# Patient Record
Sex: FEMALE | Race: WHITE | NOT HISPANIC OR LATINO | Employment: OTHER | URBAN - METROPOLITAN AREA
[De-identification: names, ages, dates, MRNs, and addresses within clinical notes are randomized per-mention and may not be internally consistent; named-entity substitution may affect disease eponyms.]

---

## 2023-11-15 ENCOUNTER — ANCILLARY PROCEDURE (OUTPATIENT)
Dept: RADIOLOGY | Facility: CLINIC | Age: 78
End: 2023-11-15
Payer: MEDICARE

## 2023-11-15 ENCOUNTER — LAB (OUTPATIENT)
Dept: LAB | Facility: LAB | Age: 78
End: 2023-11-15
Payer: MEDICARE

## 2023-11-15 ENCOUNTER — OFFICE VISIT (OUTPATIENT)
Dept: PRIMARY CARE | Facility: CLINIC | Age: 78
End: 2023-11-15
Payer: MEDICARE

## 2023-11-15 VITALS
SYSTOLIC BLOOD PRESSURE: 107 MMHG | HEIGHT: 62 IN | WEIGHT: 127.8 LBS | HEART RATE: 67 BPM | TEMPERATURE: 97.4 F | DIASTOLIC BLOOD PRESSURE: 69 MMHG | BODY MASS INDEX: 23.52 KG/M2 | OXYGEN SATURATION: 98 %

## 2023-11-15 DIAGNOSIS — Z00.00 ROUTINE GENERAL MEDICAL EXAMINATION AT HEALTH CARE FACILITY: ICD-10-CM

## 2023-11-15 DIAGNOSIS — E78.49 OTHER HYPERLIPIDEMIA: ICD-10-CM

## 2023-11-15 DIAGNOSIS — Z00.00 MEDICARE ANNUAL WELLNESS VISIT, SUBSEQUENT: Primary | ICD-10-CM

## 2023-11-15 DIAGNOSIS — R53.83 OTHER FATIGUE: ICD-10-CM

## 2023-11-15 DIAGNOSIS — B00.9 HERPES: ICD-10-CM

## 2023-11-15 DIAGNOSIS — R21 RASH: ICD-10-CM

## 2023-11-15 DIAGNOSIS — E78.5 HYPERLIPIDEMIA, UNSPECIFIED HYPERLIPIDEMIA TYPE: ICD-10-CM

## 2023-11-15 DIAGNOSIS — E55.9 VITAMIN D DEFICIENCY: ICD-10-CM

## 2023-11-15 DIAGNOSIS — E78.00 ELEVATED SERUM CHOLESTEROL: ICD-10-CM

## 2023-11-15 DIAGNOSIS — Z12.31 VISIT FOR SCREENING MAMMOGRAM: ICD-10-CM

## 2023-11-15 DIAGNOSIS — D12.6 ADENOMATOUS POLYP OF COLON, UNSPECIFIED PART OF COLON: ICD-10-CM

## 2023-11-15 DIAGNOSIS — Z13.6 SCREENING FOR HEART DISEASE: ICD-10-CM

## 2023-11-15 DIAGNOSIS — M40.04 POSTURAL KYPHOSIS OF THORACIC REGION: ICD-10-CM

## 2023-11-15 DIAGNOSIS — M54.9 UPPER BACK PAIN: ICD-10-CM

## 2023-11-15 DIAGNOSIS — I83.813 VARICOSE VEINS OF BOTH LOWER EXTREMITIES WITH PAIN: ICD-10-CM

## 2023-11-15 DIAGNOSIS — L23.9 ALLERGIC CONTACT DERMATITIS, UNSPECIFIED TRIGGER: ICD-10-CM

## 2023-11-15 DIAGNOSIS — D12.6 TUBULAR ADENOMA OF COLON: ICD-10-CM

## 2023-11-15 PROBLEM — D22.5 MELANOCYTIC NEVI OF TRUNK: Status: ACTIVE | Noted: 2023-05-02

## 2023-11-15 PROBLEM — L82.1 OTHER SEBORRHEIC KERATOSIS: Status: ACTIVE | Noted: 2023-05-02

## 2023-11-15 PROBLEM — R92.2 DENSE BREASTS: Status: ACTIVE | Noted: 2023-11-15

## 2023-11-15 PROBLEM — L57.8 OTHER SKIN CHANGES DUE TO CHRONIC EXPOSURE TO NONIONIZING RADIATION: Status: ACTIVE | Noted: 2023-05-02

## 2023-11-15 PROBLEM — R92.30 DENSE BREASTS: Status: ACTIVE | Noted: 2023-11-15

## 2023-11-15 PROBLEM — K63.5 COLON POLYP: Status: ACTIVE | Noted: 2023-11-15

## 2023-11-15 PROBLEM — D18.01 HEMANGIOMA OF SKIN AND SUBCUTANEOUS TISSUE: Status: ACTIVE | Noted: 2023-05-02

## 2023-11-15 LAB
25(OH)D3 SERPL-MCNC: 50 NG/ML (ref 30–100)
ALBUMIN SERPL BCP-MCNC: 4.6 G/DL (ref 3.4–5)
ALP SERPL-CCNC: 69 U/L (ref 33–136)
ALT SERPL W P-5'-P-CCNC: 14 U/L (ref 7–45)
ANION GAP SERPL CALC-SCNC: 15 MMOL/L (ref 10–20)
AST SERPL W P-5'-P-CCNC: 25 U/L (ref 9–39)
BASOPHILS # BLD AUTO: 0.07 X10*3/UL (ref 0–0.1)
BASOPHILS NFR BLD AUTO: 1.4 %
BILIRUB SERPL-MCNC: 0.9 MG/DL (ref 0–1.2)
BUN SERPL-MCNC: 15 MG/DL (ref 6–23)
CALCIUM SERPL-MCNC: 9.7 MG/DL (ref 8.6–10.3)
CHLORIDE SERPL-SCNC: 102 MMOL/L (ref 98–107)
CHOLEST SERPL-MCNC: 216 MG/DL (ref 0–199)
CHOLESTEROL/HDL RATIO: 3.1
CO2 SERPL-SCNC: 28 MMOL/L (ref 21–32)
CREAT SERPL-MCNC: 0.84 MG/DL (ref 0.5–1.05)
EOSINOPHIL # BLD AUTO: 0.09 X10*3/UL (ref 0–0.4)
EOSINOPHIL NFR BLD AUTO: 1.8 %
ERYTHROCYTE [DISTWIDTH] IN BLOOD BY AUTOMATED COUNT: 13.1 % (ref 11.5–14.5)
GFR SERPL CREATININE-BSD FRML MDRD: 72 ML/MIN/1.73M*2
GLUCOSE SERPL-MCNC: 78 MG/DL (ref 74–99)
HCT VFR BLD AUTO: 39.8 % (ref 36–46)
HDLC SERPL-MCNC: 70.1 MG/DL
HGB BLD-MCNC: 13.1 G/DL (ref 12–16)
IMM GRANULOCYTES # BLD AUTO: 0.01 X10*3/UL (ref 0–0.5)
IMM GRANULOCYTES NFR BLD AUTO: 0.2 % (ref 0–0.9)
LDLC SERPL CALC-MCNC: 128 MG/DL
LYMPHOCYTES # BLD AUTO: 1.43 X10*3/UL (ref 0.8–3)
LYMPHOCYTES NFR BLD AUTO: 28.8 %
MCH RBC QN AUTO: 31.3 PG (ref 26–34)
MCHC RBC AUTO-ENTMCNC: 32.9 G/DL (ref 32–36)
MCV RBC AUTO: 95 FL (ref 80–100)
MONOCYTES # BLD AUTO: 0.34 X10*3/UL (ref 0.05–0.8)
MONOCYTES NFR BLD AUTO: 6.8 %
NEUTROPHILS # BLD AUTO: 3.03 X10*3/UL (ref 1.6–5.5)
NEUTROPHILS NFR BLD AUTO: 61 %
NON HDL CHOLESTEROL: 146 MG/DL (ref 0–149)
NRBC BLD-RTO: 0 /100 WBCS (ref 0–0)
PLATELET # BLD AUTO: 242 X10*3/UL (ref 150–450)
POTASSIUM SERPL-SCNC: 4.5 MMOL/L (ref 3.5–5.3)
PROT SERPL-MCNC: 7.5 G/DL (ref 6.4–8.2)
RBC # BLD AUTO: 4.19 X10*6/UL (ref 4–5.2)
SODIUM SERPL-SCNC: 140 MMOL/L (ref 136–145)
TRIGL SERPL-MCNC: 92 MG/DL (ref 0–149)
TSH SERPL-ACNC: 3 MIU/L (ref 0.44–3.98)
VLDL: 18 MG/DL (ref 0–40)
WBC # BLD AUTO: 5 X10*3/UL (ref 4.4–11.3)

## 2023-11-15 PROCEDURE — 80061 LIPID PANEL: CPT

## 2023-11-15 PROCEDURE — 72072 X-RAY EXAM THORAC SPINE 3VWS: CPT | Performed by: RADIOLOGY

## 2023-11-15 PROCEDURE — 72070 X-RAY EXAM THORAC SPINE 2VWS: CPT | Mod: FY

## 2023-11-15 PROCEDURE — 99214 OFFICE O/P EST MOD 30 MIN: CPT | Performed by: INTERNAL MEDICINE

## 2023-11-15 PROCEDURE — 1160F RVW MEDS BY RX/DR IN RCRD: CPT | Performed by: INTERNAL MEDICINE

## 2023-11-15 PROCEDURE — 85025 COMPLETE CBC W/AUTO DIFF WBC: CPT

## 2023-11-15 PROCEDURE — 1159F MED LIST DOCD IN RCRD: CPT | Performed by: INTERNAL MEDICINE

## 2023-11-15 PROCEDURE — 93000 ELECTROCARDIOGRAM COMPLETE: CPT | Performed by: INTERNAL MEDICINE

## 2023-11-15 PROCEDURE — G0442 ANNUAL ALCOHOL SCREEN 15 MIN: HCPCS | Performed by: INTERNAL MEDICINE

## 2023-11-15 PROCEDURE — G0444 DEPRESSION SCREEN ANNUAL: HCPCS | Performed by: INTERNAL MEDICINE

## 2023-11-15 PROCEDURE — 84443 ASSAY THYROID STIM HORMONE: CPT

## 2023-11-15 PROCEDURE — 80053 COMPREHEN METABOLIC PANEL: CPT

## 2023-11-15 PROCEDURE — 82306 VITAMIN D 25 HYDROXY: CPT

## 2023-11-15 PROCEDURE — 1036F TOBACCO NON-USER: CPT | Performed by: INTERNAL MEDICINE

## 2023-11-15 PROCEDURE — 36415 COLL VENOUS BLD VENIPUNCTURE: CPT

## 2023-11-15 PROCEDURE — 1170F FXNL STATUS ASSESSED: CPT | Performed by: INTERNAL MEDICINE

## 2023-11-15 PROCEDURE — G0439 PPPS, SUBSEQ VISIT: HCPCS | Performed by: INTERNAL MEDICINE

## 2023-11-15 RX ORDER — FLUTICASONE PROPIONATE 50 MCG
1 SPRAY, SUSPENSION (ML) NASAL 2 TIMES DAILY
COMMUNITY
Start: 2022-08-29 | End: 2023-11-15 | Stop reason: ALTCHOICE

## 2023-11-15 RX ORDER — CETIRIZINE HYDROCHLORIDE 10 MG/1
10 TABLET ORAL
COMMUNITY
Start: 2022-08-29 | End: 2023-11-15 | Stop reason: ALTCHOICE

## 2023-11-15 RX ORDER — MOMETASONE FUROATE 1 MG/G
1 CREAM TOPICAL DAILY
Qty: 15 G | Refills: 1 | Status: SHIPPED | OUTPATIENT
Start: 2023-11-15

## 2023-11-15 RX ORDER — ACYCLOVIR 400 MG/1
400 TABLET ORAL 2 TIMES DAILY
Qty: 60 TABLET | Refills: 2 | Status: SHIPPED | OUTPATIENT
Start: 2023-11-15

## 2023-11-15 RX ORDER — ACYCLOVIR 400 MG/1
1 TABLET ORAL 2 TIMES DAILY
COMMUNITY
Start: 2015-03-12 | End: 2023-11-15 | Stop reason: SDUPTHER

## 2023-11-15 RX ORDER — MOMETASONE FUROATE 1 MG/G
1 CREAM TOPICAL 2 TIMES DAILY
COMMUNITY
Start: 2012-06-06 | End: 2023-11-15 | Stop reason: SDUPTHER

## 2023-11-15 RX ORDER — MULTIVITAMIN
1 TABLET ORAL DAILY
COMMUNITY

## 2023-11-15 ASSESSMENT — ACTIVITIES OF DAILY LIVING (ADL)
DOING_HOUSEWORK: INDEPENDENT
GROCERY_SHOPPING: INDEPENDENT
BATHING: INDEPENDENT
MANAGING_FINANCES: INDEPENDENT
DRESSING: INDEPENDENT
TAKING_MEDICATION: INDEPENDENT

## 2023-11-15 ASSESSMENT — ENCOUNTER SYMPTOMS
CONSTITUTIONAL NEGATIVE: 1
NEUROLOGICAL NEGATIVE: 1
HEMATOLOGIC/LYMPHATIC NEGATIVE: 1
PSYCHIATRIC NEGATIVE: 1
GASTROINTESTINAL NEGATIVE: 1
EYES NEGATIVE: 1
RESPIRATORY NEGATIVE: 1
ROS SKIN COMMENTS: HPI.
CARDIOVASCULAR NEGATIVE: 1

## 2023-11-15 ASSESSMENT — PATIENT HEALTH QUESTIONNAIRE - PHQ9
SUM OF ALL RESPONSES TO PHQ9 QUESTIONS 1 AND 2: 0
1. LITTLE INTEREST OR PLEASURE IN DOING THINGS: NOT AT ALL
2. FEELING DOWN, DEPRESSED OR HOPELESS: NOT AT ALL

## 2023-11-15 NOTE — ASSESSMENT & PLAN NOTE
Renew steroid cream.   Calcipotriene Pregnancy And Lactation Text: This medication has not been proven safe during pregnancy. It is unknown if this medication is excreted in breast milk.

## 2023-11-15 NOTE — PROGRESS NOTES
"Subjective   Patient ID: Nhung Pimentel is a 77 y.o. female who presents for Medicare Annual Wellness Visit Subsequent.    This is a 77 year old pt here for subsequent MCR,,f/u on HLD .  she has achy legs and varicoses of legs.  she has \"bad posture\".  She tried to wear back brace but it caused some discomfort last DEXA look okay from last years.  she had covid in August 2022,mild case.   Last hector 11/25/22.  Last DEXA 12/1/22.  Last colonoscopy 11/4/2019,next in 5 years due to h/o adenoma.  She has not frequent oral herpes and occasional rash when working in the yard and she needs refill on both prescription for topical steroid and oral Zovirax.  She eats healthy in general and stay active denies any chest pain or shortness of breath.         Review of Systems   Constitutional: Negative.    HENT: Negative.     Eyes: Negative.    Respiratory: Negative.     Cardiovascular: Negative.    Gastrointestinal: Negative.    Genitourinary: Negative.    Musculoskeletal:         As HPI.   Skin:         HPI.   Neurological: Negative.    Hematological: Negative.    Psychiatric/Behavioral: Negative.         Objective   /69   Pulse 67   Temp 36.3 °C (97.4 °F)   Ht 1.562 m (5' 1.5\")   Wt 58 kg (127 lb 12.8 oz)   SpO2 98%   BMI 23.76 kg/m²     Physical Exam  Vitals reviewed.   Constitutional:       Appearance: Normal appearance.   HENT:      Head: Normocephalic and atraumatic.      Right Ear: Tympanic membrane and ear canal normal.      Left Ear: Tympanic membrane and ear canal normal.   Eyes:      Extraocular Movements: Extraocular movements intact.      Pupils: Pupils are equal, round, and reactive to light.   Neck:      Vascular: No carotid bruit.   Cardiovascular:      Rate and Rhythm: Normal rate and regular rhythm.      Pulses: Normal pulses.      Heart sounds: Normal heart sounds. No murmur heard.     Comments: Pattern dilated varicose veins on both legs worse on the right side.  Pulmonary:      Effort: Pulmonary " effort is normal.      Breath sounds: Normal breath sounds.   Abdominal:      General: Abdomen is flat. Bowel sounds are normal.      Palpations: Abdomen is soft.   Musculoskeletal:      Cervical back: Normal range of motion and neck supple.      Right lower leg: No edema.      Left lower leg: No edema.      Comments: Back kyphosis.  Limited range of motion of both hips worse on the right hip.   Lymphadenopathy:      Cervical: No cervical adenopathy.   Skin:     Comments: Supportive keratosis on back.   Neurological:      General: No focal deficit present.      Mental Status: She is alert and oriented to person, place, and time.   Psychiatric:         Mood and Affect: Mood normal.         Assessment/Plan   Problem List Items Addressed This Visit             ICD-10-CM    Allergic contact dermatitis, unspecified cause L23.9     Renew steroid cream.         Colon polyp K63.5    Elevated serum cholesterol E78.00     Continue low-fat diet.  EKG done today.  Will check CT cardiac scoring for CAD screening she told me that her father  at age 54 but she was not close to needing and aware of the  cause of death, but he was alcoholic.         Herpes B00.9     Renew Zovirax she gets flareup very rarely.         Relevant Medications    acyclovir (Zovirax) 400 mg tablet    Other fatigue R53.83    Relevant Orders    CBC and Auto Differential    Comprehensive Metabolic Panel    TSH with reflex to Free T4 if abnormal    Tubular adenoma of colon D12.6     Colonoscopy up-to-date next colonoscopy 2024.         Medicare annual wellness visit, subsequent - Primary Z00.00     Order fasting labs.  Order mammogram.  Return in 1 year.  Eat healthy,exercise regularly.  Next colonoscopy 24.  Next DEXA 24.  I recommend RSV vaccine.         Other hyperlipidemia E78.49    Relevant Orders    Lipid Panel    TSH with reflex to Free T4 if abnormal    Hyperlipidemia E78.5    Relevant Orders    Lipid Panel    TSH with reflex  to Free T4 if abnormal    ECG 12 lead (Clinic Performed) (Completed)    Visit for screening mammogram Z12.31    Relevant Orders    BI mammo bilateral screening tomosynthesis    Screening for heart disease Z13.6    Relevant Orders    CT cardiac scoring wo IV contrast    Upper back pain M54.9    Relevant Orders    XR thoracic spine 2 views    Referral to Physical Therapy    Postural kyphosis of thoracic region M40.04     DEXA up-to-date.  Refer to physical therapy.  Check thoracic spine x-ray.         Vitamin D deficiency E55.9    Relevant Orders    Vitamin D 25-Hydroxy,Total (for eval of Vitamin D levels)    Varicose veins of both lower extremities with pain I83.813     Recommend compression stocking.  Refer to vascular surgeon, to see Dr. Meredith.          Other Visit Diagnoses         Codes    Rash     R21    Relevant Medications    mometasone (Elocon) 0.1 % cream    Routine general medical examination at health care facility     Z00.00

## 2023-11-15 NOTE — ASSESSMENT & PLAN NOTE
Order fasting labs.  Order mammogram.  Return in 1 year.  Eat healthy,exercise regularly.  Next colonoscopy 11/4/24.  Next DEXA 12/1/24.  I recommend RSV vaccine.

## 2023-11-15 NOTE — ASSESSMENT & PLAN NOTE
Continue low-fat diet.  EKG done today.  Will check CT cardiac scoring for CAD screening she told me that her father  at age 54 but she was not close to needing and aware of the  cause of death, but he was alcoholic.

## 2023-12-01 ENCOUNTER — ANCILLARY PROCEDURE (OUTPATIENT)
Dept: RADIOLOGY | Facility: CLINIC | Age: 78
End: 2023-12-01
Payer: MEDICARE

## 2023-12-01 DIAGNOSIS — Z13.6 SCREENING FOR HEART DISEASE: ICD-10-CM

## 2023-12-01 PROCEDURE — 75571 CT HRT W/O DYE W/CA TEST: CPT

## 2023-12-05 ENCOUNTER — TELEPHONE (OUTPATIENT)
Dept: PRIMARY CARE | Facility: CLINIC | Age: 78
End: 2023-12-05
Payer: MEDICARE

## 2023-12-05 NOTE — RESULT ENCOUNTER NOTE
Please call the patient regarding her abnormal result.  CT showed possible pneumonia please schedule an appointment to see me at the office this week for 15 minutes and have him to COVID test at home.

## 2023-12-05 NOTE — TELEPHONE ENCOUNTER
----- Message from Rachel Stewart MD sent at 12/5/2023  1:37 PM EST -----  Regarding: appt  Please call patient tell her that,Her cardiac calcium score show possible pneumonia tender to see me tomorrow at 915 at the office but due to COVID test at home if positive will change to virtual.

## 2023-12-06 ENCOUNTER — OFFICE VISIT (OUTPATIENT)
Dept: PRIMARY CARE | Facility: CLINIC | Age: 78
End: 2023-12-06
Payer: MEDICARE

## 2023-12-06 VITALS
HEIGHT: 62 IN | OXYGEN SATURATION: 96 % | TEMPERATURE: 97.3 F | HEART RATE: 67 BPM | WEIGHT: 126.2 LBS | BODY MASS INDEX: 23.22 KG/M2 | DIASTOLIC BLOOD PRESSURE: 77 MMHG | SYSTOLIC BLOOD PRESSURE: 134 MMHG

## 2023-12-06 DIAGNOSIS — J47.0 BRONCHIECTASIS WITH ACUTE LOWER RESPIRATORY INFECTION (MULTI): Primary | ICD-10-CM

## 2023-12-06 DIAGNOSIS — E78.00 ELEVATED SERUM CHOLESTEROL: ICD-10-CM

## 2023-12-06 PROCEDURE — 1036F TOBACCO NON-USER: CPT | Performed by: INTERNAL MEDICINE

## 2023-12-06 PROCEDURE — 1159F MED LIST DOCD IN RCRD: CPT | Performed by: INTERNAL MEDICINE

## 2023-12-06 PROCEDURE — 99214 OFFICE O/P EST MOD 30 MIN: CPT | Performed by: INTERNAL MEDICINE

## 2023-12-06 PROCEDURE — 1160F RVW MEDS BY RX/DR IN RCRD: CPT | Performed by: INTERNAL MEDICINE

## 2023-12-06 RX ORDER — AZITHROMYCIN 250 MG/1
TABLET, FILM COATED ORAL
Qty: 6 TABLET | Refills: 0 | Status: SHIPPED | OUTPATIENT
Start: 2023-12-06 | End: 2023-12-07

## 2023-12-06 RX ORDER — ALBUTEROL SULFATE 90 UG/1
2 AEROSOL, METERED RESPIRATORY (INHALATION) EVERY 4 HOURS PRN
Qty: 8.5 G | Refills: 0 | Status: SHIPPED | OUTPATIENT
Start: 2023-12-06 | End: 2024-12-05

## 2023-12-06 ASSESSMENT — PATIENT HEALTH QUESTIONNAIRE - PHQ9
SUM OF ALL RESPONSES TO PHQ9 QUESTIONS 1 AND 2: 0
2. FEELING DOWN, DEPRESSED OR HOPELESS: NOT AT ALL
1. LITTLE INTEREST OR PLEASURE IN DOING THINGS: NOT AT ALL

## 2023-12-06 NOTE — PATIENT INSTRUCTIONS
Take prescribed antibiotic.  Take OTC Mucinex 1 tab twice daily with large glass of water.  Refer to pulmonologist.  Use inhaler as needed for shortness of breath.

## 2023-12-06 NOTE — PROGRESS NOTES
"Subjective   Patient ID: Nhung Pimentel is a 78 y.o. female who presents for Discuss cardiac calcium test , Possible pneumonia , and COVID negative .    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  Patient states she noted occasional need to clear up her chest,small cough for few weeks, denies any wheezing or shortness of breath, she was never a smoker, no fever or chills.no blood in her sputum,no weight loss,no h/o travel.  Her home covid test was negative today.         Review of Systems   Constitutional: Negative.    HENT: Negative.     Eyes: Negative.    Respiratory:          As HPI.   Cardiovascular: Negative.    Gastrointestinal: Negative.    Genitourinary: Negative.    Neurological: Negative.    Hematological: Negative.    Psychiatric/Behavioral: Negative.         Objective   /77 (BP Location: Left arm, Patient Position: Sitting, BP Cuff Size: Adult)   Pulse 67   Temp 36.3 °C (97.3 °F) (Temporal)   Ht 1.562 m (5' 1.5\")   Wt 57.2 kg (126 lb 3.2 oz)   SpO2 96%   BMI 23.46 kg/m²     Physical Exam  Constitutional:       Appearance: Normal appearance.   HENT:      Head: Normocephalic and atraumatic.   Eyes:      Extraocular Movements: Extraocular movements intact.      Pupils: Pupils are equal, round, and reactive to light.   Cardiovascular:      Rate and Rhythm: Normal rate and regular rhythm.      Heart sounds: Normal heart sounds.   Pulmonary:      Effort: Pulmonary effort is normal.      Breath sounds: Normal breath sounds. No wheezing or rhonchi.      Comments: Few crackles right mid and lower lung clearing up with cough.  No wheezing.  Abdominal:      General: Abdomen is flat. There is no distension.      Palpations: Abdomen is soft.   Musculoskeletal:         General: Normal range of motion.      Cervical back: Normal range of motion and neck supple.      Right " lower leg: No edema.      Left lower leg: No edema.   Skin:     General: Skin is warm.   Neurological:      General: No focal deficit present.      Mental Status: She is alert and oriented to person, place, and time.   Psychiatric:         Mood and Affect: Mood normal.         Behavior: Behavior normal.         Assessment/Plan   Problem List Items Addressed This Visit             ICD-10-CM    Elevated serum cholesterol E78.00     Your cardiac calcium score was 0.  Will follow a low-fat diet.  Recheck lipids in 1 year.         Bronchiectasis with acute lower respiratory infection (CMS/[facility]) - Primary J47.0     Will treat with Zithromax for now,but will refer to pulmonary for further evaluation and management for possible bronchoscopy and cultures.  Will also add bronchodilator for cough and symptoms relief .         Relevant Medications    albuterol (ProAir HFA) 90 mcg/actuation inhaler    azithromycin (Zithromax) 250 mg tablet    Other Relevant Orders    Referral to Pulmonology

## 2023-12-07 ENCOUNTER — TELEPHONE (OUTPATIENT)
Dept: PRIMARY CARE | Facility: CLINIC | Age: 78
End: 2023-12-07
Payer: MEDICARE

## 2023-12-07 RX ORDER — AZITHROMYCIN 250 MG/1
TABLET, FILM COATED ORAL
Qty: 10 TABLET | Refills: 0 | Status: SHIPPED | OUTPATIENT
Start: 2023-12-07 | End: 2023-12-08 | Stop reason: SDUPTHER

## 2023-12-07 ASSESSMENT — ENCOUNTER SYMPTOMS
CONSTITUTIONAL NEGATIVE: 1
CARDIOVASCULAR NEGATIVE: 1
GASTROINTESTINAL NEGATIVE: 1
NEUROLOGICAL NEGATIVE: 1
PSYCHIATRIC NEGATIVE: 1
EYES NEGATIVE: 1
HEMATOLOGIC/LYMPHATIC NEGATIVE: 1

## 2023-12-07 NOTE — TELEPHONE ENCOUNTER
Pt called in stating her pharmacy needing clarification about directions on meds that were given to her at her recent office visit with Dr Stewart on 12/6. Please advise. Thank you!

## 2023-12-08 ENCOUNTER — TELEPHONE (OUTPATIENT)
Dept: PRIMARY CARE | Facility: CLINIC | Age: 78
End: 2023-12-08
Payer: MEDICARE

## 2023-12-08 DIAGNOSIS — J47.0 BRONCHIECTASIS WITH ACUTE LOWER RESPIRATORY INFECTION (MULTI): ICD-10-CM

## 2023-12-08 RX ORDER — AZITHROMYCIN 250 MG/1
TABLET, FILM COATED ORAL
Qty: 11 TABLET | Refills: 0 | Status: SHIPPED | OUTPATIENT
Start: 2023-12-08

## 2023-12-08 NOTE — ASSESSMENT & PLAN NOTE
Will treat with Zithromax for now,but will refer to pulmonary for further evaluation and management for possible bronchoscopy and cultures.  Will also add bronchodilator for cough and symptoms relief .

## 2023-12-12 ENCOUNTER — ANCILLARY PROCEDURE (OUTPATIENT)
Dept: RADIOLOGY | Facility: CLINIC | Age: 78
End: 2023-12-12
Payer: MEDICARE

## 2023-12-12 DIAGNOSIS — Z12.31 VISIT FOR SCREENING MAMMOGRAM: ICD-10-CM

## 2023-12-12 PROCEDURE — 77067 SCR MAMMO BI INCL CAD: CPT | Performed by: RADIOLOGY

## 2023-12-12 PROCEDURE — 77067 SCR MAMMO BI INCL CAD: CPT

## 2023-12-12 PROCEDURE — 77063 BREAST TOMOSYNTHESIS BI: CPT | Performed by: RADIOLOGY

## 2023-12-26 NOTE — PROGRESS NOTES
Patient: Nhung Pimentel    [id number]  : 1945 -- AGE 78 y.o.    Provider: Sakshi STERN- [facility]     Location [facility]   Service Date: 24          [facility]  New Visit Note    HISTORY OF PRESENT ILLNESS     The patient's referring provider is: Rachel Stewart MD    HISTORY OF PRESENT ILLNESS   Nhung Pimentel is a 78 y.o. female who is a never smoker, who presents to a [facility] for an initial evaluation for Bronchiectasis with acute lower respiratory infection.     I have independently interviewed and examined the patient in the office and reviewed available records.    Current History    On today's visit, the patient reports having a cardiac score testing done and was found to have bronchiectasis on CT scan with possible aytpical mycobaterial infection. Her PCP ordered prescribed a zpack and mucinex OTC. She reports she was able to cough a tiny amount of mucus up but was such a small amount that she could not cough it out. She reports she did have a feeling of discomfort/chest tightness on the left side of her chest when she would take a deep breath as well as a little rattle in chest but that has resolved after antibiotic. She does have a dry cough in the mornings and evenings but relates this to post nasal drip. She denies recurrent lung infections, dyspnea, wheezing, fevers, chills, weight loss and h/o travel.    Previous pulmonary history: Pneumonia once as child.     Inhalers/nebulized medications: Unknown inhaler a long time ago that did not help     Hospitalization History: He has not been hospitalized over the last year for breathing related problem.    Sleep history: Denies snoring, apnea, feeling tired during the day or taking naps during the day.     ALLERGIES AND MEDICATIONS     ALLERGIES  No Known Allergies    MEDICATIONS  Current Outpatient Medications   Medication Sig Dispense Refill    acyclovir  (Zovirax) 400 mg tablet Take 1 tablet (400 mg) by mouth 2 times a day. 60 tablet 2    albuterol (ProAir HFA) 90 mcg/actuation inhaler Inhale 2 puffs every 4 hours if needed for wheezing or shortness of breath. 8.5 g 0    azithromycin (Zithromax) 250 mg tablet Take 500 mg po day 1,then 250 mg daily for 9 days 11 tablet 0    mometasone (Elocon) 0.1 % cream Apply 1 Application topically once daily. 15 g 1    multivitamin tablet Take 1 tablet by mouth once daily.       No current facility-administered medications for this visit.         PAST HISTORY     PAST MEDICAL HISTORY  Bronchiectasis  Hyperlipidemia    PAST SURGICAL HISTORY  Past Surgical History:   Procedure Laterality Date    BREAST LUMPECTOMY Left 01/01/1990 1990's left breast excisional biopsy    DILATION AND CURETTAGE OF UTERUS  03/06/2014    Dilation And Curettage    OTHER SURGICAL HISTORY  03/06/2014    Hysteroscopy       IMMUNIZATION HISTORY  Immunization History   Administered Date(s) Administered    Flu vaccine, quadrivalent, high-dose, preservative free, age 65y+ (FLUZONE) 09/11/2020, 09/11/2021, 10/06/2022    Influenza, High Dose Seasonal, Preservative Free 09/20/2017, 10/09/2018    Influenza, Seasonal, Quadrivalent, Adjuvanted 10/19/2023    Influenza, injectable, quadrivalent 10/30/2014    Influenza, seasonal, injectable 10/13/2016    Influenza, trivalent, adjuvanted 10/16/2019    Pfizer COVID-19 vaccine, Fall 2023, 12 years and older, (30mcg/0.3mL) 10/19/2023    Pfizer COVID-19 vaccine, bivalent, age 12 years and older (30 mcg/0.3 mL) 11/25/2022    Pfizer Gray Cap SARS-CoV-2 04/14/2022    Pfizer Purple Cap SARS-CoV-2 03/03/2021, 03/24/2021, 09/29/2021    Pneumococcal conjugate vaccine, 13-valent (PREVNAR 13) 11/01/2016    Pneumococcal polysaccharide vaccine, 23-valent, age 2 years and older (PNEUMOVAX 23) 10/17/2007, 08/03/2012    Tdap vaccine, age 7 year and older (BOOSTRIX) 06/08/2015    Zoster vaccine, recombinant, adult (SHINGRIX) 01/28/2019,  06/03/2019    Zoster, live 08/21/2007       SOCIAL HISTORY  Tobacco Smoking: never- second hand smoke child growing up/  smoked inside for 18 years  Illicit drugs: none  Alcohol consumption: none  Pets: none    OCCUPATIONAL/ENVIRONMENTAL HISTORY  Occupation: Retired. . Manager at [employer]  No known exposure to asbestos, silica, beryllium or inhaled metals.  No exposure to birds or exotic animals.    FAMILY HISTORY  No family history of pulmonary disease.  No family history of cancer.  No family history of autoimmune disorders.    REVIEW OF SYSTEMS     REVIEW OF SYSTEMS  Review of Systems    Constitutional: No fever, no chills, no night sweats.    Eyes: No double vision, no floaters, no dry eyes.   ENT: See HPI.   Neck: No neck stiffness.  Cardiovascular: No sharp chest pain, no heart racing, no leg swelling.  Respiratory: as noted in HPI.   Gastrointestinal: No nausea, no vomiting, no diarrhea.   Musculoskeletal: No joint pain, no back pain.   Integumentary: No rashes or sores.  Neurological: No dizziness, no headaches. Sleeping well.  Psychiatric: No mood changes.   Endocrine: No hot flashes, no cold intolerance, weight is stable.  Hematologic: No easy bruising or bleeding.    PHYSICAL EXAM     VITAL SIGNS:   Vitals:    01/04/24 1016   BP: 113/73   Pulse: 71   Resp: 16   Temp: 36.3 °C (97.3 °F)   SpO2: 98%         CURRENT WEIGHT: Body mass index is 23.92 kg/m².    PREVIOUS WEIGHTS:  Wt Readings from Last 3 Encounters:   12/06/23 57.2 kg (126 lb 3.2 oz)   11/15/23 58 kg (127 lb 12.8 oz)   11/15/22 58.6 kg (129 lb 2 oz)       Physical Exam    Constitutional: General appearance: Alert and oriented.  No acute distress. Well developed, well nourished.  Head and face: Symmetric  Pulmonary: Chest is normal. No increased work of breathing or signs of respiratory distress. Clear to auscultation bilaterally - no crackles, wheezing, or rhonchi.   Cardiovascular: Heart rate and rhythm normal. Normal  "S1, S2 - no murmurs, gallops, or pericardial rub.   Abdomen: Soft, non tender, +BS  Extremities: No edema. No clubbing or cyanosis of the fingernails.    Neurologic: Moves all four extremities   MSK: Normal movements of extremities. Gait normal   Psychiatric: Intact judgement and insight.    RESULTS/DATA     Pulmonary Function Test Results     Pulmonary Functions Testing Results:                      Chest Radiograph     No testing done.      Chest CT Scan     No testing done.      Echocardiogram     No testing done.       Labwork   Complete Blood Count  Lab Results   Component Value Date    WBC 5.0 11/15/2023    HGB 13.1 11/15/2023    HCT 39.8 11/15/2023    MCV 95 11/15/2023     11/15/2023       Peripheral Eosinophil Count/Percentage:   Eosinophils Absolute (x10*3/uL)   Date Value   11/15/2023 0.09     Eosinophils % (%)   Date Value   11/15/2023 1.8       Serum Immunoglobulin E:    No results found for: \"IGE\"     A1AT genotype:     ASSESSMENT/PLAN   Ms. Pimentel is a 78 y.o. female, who is a never smoker, who presents to a [facility] for an initial evaluation for Bronchiectasis with acute lower respiratory infection.     Calcium Score: Mild bronchiectasis, mucous plugging and atelectasis noted within  lingula and medial right middle lobe. Correlate with concern for  atypical mycobacterial infection/colonization.    Problem List and Orders  Problem List Items Addressed This Visit       Bronchiectasis with acute lower respiratory infection (CMS/HCC)     Other Visit Diagnoses       Bronchiectasis without complication (CMS/HCC)    -  Primary    Relevant Medications    mucus clearing device device    Other Relevant Orders    CT chest wo IV contrast    Complete Pulmonary Function Test Pre/Post Bronchodialator (Spirometry Pre/Post/DLCO/Lung Volumes)    Immunoglobulins (IgG, IgA, IgM)            Assessment and Plan / Recommendations:  Problem List Items Addressed This Visit  "   None     Bronchiectasis on CT scan vs atypical mycobacterial infection  - will get labwork for IgG, IgM and IgA  - will get PFTs  - will do A1AT testing today - MA forgot will get at next visit  - will CT Chest scan  - will order acapella mucus clearing device to try for a couple weeks    Follow up in 4-6 weeks after CT Chest and PFTs or sooner if needed.    If you have any questions please call the office [phone]    Thank you for visiting the Pulmonary clinic today!   Sakshi Vickers CNP  [phone]

## 2024-01-04 ENCOUNTER — LAB (OUTPATIENT)
Dept: LAB | Facility: LAB | Age: 79
End: 2024-01-04
Payer: MEDICARE

## 2024-01-04 ENCOUNTER — OFFICE VISIT (OUTPATIENT)
Dept: PULMONOLOGY | Facility: CLINIC | Age: 79
End: 2024-01-04
Payer: MEDICARE

## 2024-01-04 VITALS
WEIGHT: 130.8 LBS | TEMPERATURE: 97.3 F | SYSTOLIC BLOOD PRESSURE: 113 MMHG | OXYGEN SATURATION: 98 % | RESPIRATION RATE: 16 BRPM | DIASTOLIC BLOOD PRESSURE: 73 MMHG | BODY MASS INDEX: 24.07 KG/M2 | HEART RATE: 71 BPM | HEIGHT: 62 IN

## 2024-01-04 DIAGNOSIS — J47.9 BRONCHIECTASIS WITHOUT COMPLICATION (MULTI): Primary | ICD-10-CM

## 2024-01-04 DIAGNOSIS — J47.9 BRONCHIECTASIS WITHOUT COMPLICATION (MULTI): ICD-10-CM

## 2024-01-04 DIAGNOSIS — J47.0 BRONCHIECTASIS WITH ACUTE LOWER RESPIRATORY INFECTION (MULTI): ICD-10-CM

## 2024-01-04 PROCEDURE — 36415 COLL VENOUS BLD VENIPUNCTURE: CPT

## 2024-01-04 PROCEDURE — 1159F MED LIST DOCD IN RCRD: CPT

## 2024-01-04 PROCEDURE — 82784 ASSAY IGA/IGD/IGG/IGM EACH: CPT

## 2024-01-04 PROCEDURE — 1036F TOBACCO NON-USER: CPT

## 2024-01-04 PROCEDURE — 99204 OFFICE O/P NEW MOD 45 MIN: CPT

## 2024-01-04 RX ORDER — VIT C/E/ZN/COPPR/LUTEIN/ZEAXAN 250MG-90MG
25 CAPSULE ORAL DAILY
COMMUNITY

## 2024-01-04 RX ORDER — CALCIUM CARBONATE 500(1250)
1 TABLET,CHEWABLE ORAL DAILY
COMMUNITY

## 2024-01-04 RX ORDER — GLUCOSAM/CHONDRO/HERB 149/HYAL 750-100 MG
1 TABLET ORAL DAILY
COMMUNITY

## 2024-01-04 RX ORDER — MAGNESIUM 200 MG
TABLET ORAL
COMMUNITY

## 2024-01-04 ASSESSMENT — ENCOUNTER SYMPTOMS
LOSS OF SENSATION IN FEET: 0
DEPRESSION: 0
OCCASIONAL FEELINGS OF UNSTEADINESS: 0

## 2024-01-04 ASSESSMENT — COLUMBIA-SUICIDE SEVERITY RATING SCALE - C-SSRS
2. HAVE YOU ACTUALLY HAD ANY THOUGHTS OF KILLING YOURSELF?: NO
1. IN THE PAST MONTH, HAVE YOU WISHED YOU WERE DEAD OR WISHED YOU COULD GO TO SLEEP AND NOT WAKE UP?: NO
6. HAVE YOU EVER DONE ANYTHING, STARTED TO DO ANYTHING, OR PREPARED TO DO ANYTHING TO END YOUR LIFE?: NO

## 2024-01-04 ASSESSMENT — PATIENT HEALTH QUESTIONNAIRE - PHQ9
2. FEELING DOWN, DEPRESSED OR HOPELESS: NOT AT ALL
1. LITTLE INTEREST OR PLEASURE IN DOING THINGS: NOT AT ALL
SUM OF ALL RESPONSES TO PHQ9 QUESTIONS 1 AND 2: 0

## 2024-01-05 LAB
IGA SERPL-MCNC: 251 MG/DL (ref 70–400)
IGG SERPL-MCNC: 970 MG/DL (ref 700–1600)
IGM SERPL-MCNC: 182 MG/DL (ref 40–230)

## 2024-01-15 ENCOUNTER — ANCILLARY PROCEDURE (OUTPATIENT)
Dept: RADIOLOGY | Facility: CLINIC | Age: 79
End: 2024-01-15
Payer: MEDICARE

## 2024-01-15 DIAGNOSIS — J47.9 BRONCHIECTASIS WITHOUT COMPLICATION (MULTI): ICD-10-CM

## 2024-01-15 PROCEDURE — 71250 CT THORAX DX C-: CPT | Performed by: RADIOLOGY

## 2024-01-15 PROCEDURE — 71250 CT THORAX DX C-: CPT

## 2024-01-18 ENCOUNTER — PATIENT MESSAGE (OUTPATIENT)
Dept: PULMONOLOGY | Facility: CLINIC | Age: 79
End: 2024-01-18
Payer: MEDICARE

## 2024-01-19 NOTE — TELEPHONE ENCOUNTER
From: Nhung Pimentel  To: Sakshi Vickers, APRN-CNP  Sent: 1/18/2024 10:19 PM EST  Subject: CT scan results    [facility],   Regarding my January 15th CT scan, what are the next steps?     Thank you,    Nhung Pimentel  [phone]

## 2024-01-22 NOTE — PROGRESS NOTES
Patient: Nhung Pimentel    [id number]  : 1945 -- AGE 78 y.o.    Provider: Sakshi STERN- [facility]     Location [facility]   Service Date: 24          [facility]  New Visit Note  Virtual or Telephone Consent  A telephone visit (audio only) between the patient (at the originating site) and the provider (at the distant site) was utilized to provide this telehealth service.   Verbal consent was requested and obtained from Nhung Pimentel on this date, 24 for a telehealth visit.     HISTORY OF PRESENT ILLNESS     The patient's referring provider is: Rachel Stewart MD    HISTORY OF PRESENT ILLNESS   Nhung Pimentel is a 78 y.o. female who is a never smoker, who presents to a [facility] for an initial evaluation for Bronchiectasis and mucus plugging.    I have independently interviewed and examined the patient in the office and reviewed available records.    Current History    Since last visit she has not been taking mucinex. She never received acapella device. She reports she was able to cough a tiny amount of mucus up but was such a small amount that she could not cough it out. She reports being unable to take a deep breath, and feels her cough is no very strong. She does have a dry cough in the mornings and evenings but relates this to post nasal drip. She reports having COVID 1 year ago. She denies recurrent lung infections, dyspnea, wheezing, fevers, chills, weight loss and h/o travel.    23: On today's visit, the patient reports having a cardiac score testing done and was found to have bronchiectasis on CT scan with possible aytpical mycobaterial infection. Her PCP ordered prescribed a zpack and mucinex OTC. She reports she was able to cough a tiny amount of mucus up but was such a small amount that she could not cough it out. She reports she did have a feeling of discomfort/chest tightness on the left  side of her chest when she would take a deep breath as well as a little rattle in chest but that has resolved after antibiotic. She does have a dry cough in the mornings and evenings but relates this to post nasal drip. She denies recurrent lung infections, dyspnea, wheezing, fevers, chills, weight loss and h/o travel.    Previous pulmonary history: Pneumonia once as child.     Inhalers/nebulized medications: Unknown inhaler a long time ago that did not help     Hospitalization History: He has not been hospitalized over the last year for breathing related problem.    Sleep history: Denies snoring, apnea, feeling tired during the day or taking naps during the day.     ALLERGIES AND MEDICATIONS     ALLERGIES  No Known Allergies    MEDICATIONS  Current Outpatient Medications   Medication Sig Dispense Refill    acyclovir (Zovirax) 400 mg tablet Take 1 tablet (400 mg) by mouth 2 times a day. 60 tablet 2    albuterol (ProAir HFA) 90 mcg/actuation inhaler Inhale 2 puffs every 4 hours if needed for wheezing or shortness of breath. (Patient not taking: Reported on 1/4/2024) 8.5 g 0    azithromycin (Zithromax) 250 mg tablet Take 500 mg po day 1,then 250 mg daily for 9 days (Patient not taking: Reported on 1/4/2024) 11 tablet 0    B complex-vitamin C-folic acid (Nephro-Elbert Rx) 1- mg-mg-mcg tablet Take 1 tablet by mouth once daily with breakfast.      calcium carbonate (Calcium 500) 500 mg calcium (1,250 mg) chewable tablet Chew 1 tablet (1,250 mg) once daily.      cholecalciferol (Vitamin D3) 25 MCG (1000 UT) capsule Take 1 capsule (25 mcg) by mouth once daily.      ginkgo biloba (GINKOBA ORAL) Take 1 capsule by mouth once daily.      magnesium 200 mg tablet Take by mouth.      mometasone (Elocon) 0.1 % cream Apply 1 Application topically once daily. (Patient not taking: Reported on 1/4/2024) 15 g 1    mucus clearing device device Acapella device. 1 each 0    multivitamin tablet Take 1 tablet by mouth once daily.       omega 3-dha-epa-fish oil (Fish OiL) 1,000 mg (120 mg-180 mg) capsule Take 1 capsule (1,000 mg) by mouth once daily.      Panax, American ginsg/B12/royl (GINSENG COMPLEX ORAL) Take 1 capsule by mouth once daily.      ZINC OXIDE ORAL Take by mouth.       No current facility-administered medications for this visit.         PAST HISTORY     PAST MEDICAL HISTORY  Bronchiectasis  Hyperlipidemia    PAST SURGICAL HISTORY  Past Surgical History:   Procedure Laterality Date    BREAST LUMPECTOMY Left 01/01/1990 1990's left breast excisional biopsy    DILATION AND CURETTAGE OF UTERUS  03/06/2014    Dilation And Curettage    OTHER SURGICAL HISTORY  03/06/2014    Hysteroscopy       IMMUNIZATION HISTORY  Immunization History   Administered Date(s) Administered    Flu vaccine, quadrivalent, high-dose, preservative free, age 65y+ (FLUZONE) 09/11/2020, 09/11/2021, 10/06/2022    Influenza, High Dose Seasonal, Preservative Free 09/20/2017, 10/09/2018    Influenza, Seasonal, Quadrivalent, Adjuvanted 10/19/2023    Influenza, injectable, quadrivalent 10/30/2014    Influenza, seasonal, injectable 10/13/2016    Influenza, trivalent, adjuvanted 10/16/2019    Pfizer COVID-19 vaccine, Fall 2023, 12 years and older, (30mcg/0.3mL) 10/19/2023    Pfizer COVID-19 vaccine, bivalent, age 12 years and older (30 mcg/0.3 mL) 11/25/2022    Pfizer Gray Cap SARS-CoV-2 04/14/2022    Pfizer Purple Cap SARS-CoV-2 03/03/2021, 03/24/2021, 09/29/2021    Pneumococcal conjugate vaccine, 13-valent (PREVNAR 13) 11/01/2016    Pneumococcal polysaccharide vaccine, 23-valent, age 2 years and older (PNEUMOVAX 23) 10/17/2007, 08/03/2012    Tdap vaccine, age 7 year and older (BOOSTRIX) 06/08/2015    Zoster vaccine, recombinant, adult (SHINGRIX) 01/28/2019, 06/03/2019    Zoster, live 08/21/2007       SOCIAL HISTORY  Tobacco Smoking: never- second hand smoke child growing up/  smoked inside for 18 years  Illicit drugs: none  Alcohol consumption: none  Pets:  none    OCCUPATIONAL/ENVIRONMENTAL HISTORY  Occupation: Retired. . Manager at [employer]  No known exposure to asbestos, silica, beryllium or inhaled metals.  No exposure to birds or exotic animals.    FAMILY HISTORY  No family history of pulmonary disease.  No family history of cancer.  No family history of autoimmune disorders.    REVIEW OF SYSTEMS     REVIEW OF SYSTEMS  Review of Systems    Constitutional: No fever, no chills, no night sweats.    Eyes: No double vision, no floaters, no dry eyes.   ENT: See HPI.   Neck: No neck stiffness.  Cardiovascular: No sharp chest pain, no heart racing, no leg swelling.  Respiratory: as noted in HPI.   Gastrointestinal: No nausea, no vomiting, no diarrhea.   Musculoskeletal: No joint pain, no back pain.   Integumentary: No rashes or sores.  Neurological: No dizziness, no headaches. Sleeping well.  Psychiatric: No mood changes.   Endocrine: No hot flashes, no cold intolerance, weight is stable.  Hematologic: No easy bruising or bleeding.    PHYSICAL EXAM     VITAL SIGNS:   There were no vitals filed for this visit.        CURRENT WEIGHT: There is no height or weight on file to calculate BMI.    PREVIOUS WEIGHTS:  Wt Readings from Last 3 Encounters:   01/04/24 59.3 kg (130 lb 12.8 oz)   12/06/23 57.2 kg (126 lb 3.2 oz)   11/15/23 58 kg (127 lb 12.8 oz)       Physical Exam    Constitutional: General appearance: Alert and oriented.  No acute distress. Well developed, well nourished.  Psychiatric: Intact judgement and insight.    RESULTS/DATA     Pulmonary Function Test Results     Pulmonary Functions Testing Results:                      Chest Radiograph     No testing done.      Chest CT Scan     Interpreted By:  Niraj Rendon,   STUDY:  CT CHEST WO IV CONTRAST;  1/15/2024 10:34 am      INDICATION:  Signs/Symptoms:bronchectasis/ atypical mycobacterial infection.      COMPARISON:  CT cardiac scoring 12/01/2023      ACCESSION NUMBER(S):  [id number]       ORDERING CLINICIAN:  BRADEN FRANCOIS      TECHNIQUE:  Helical data acquisition of the chest was obtained without IV  contrast material.  Images were reformatted in axial, coronal, and  sagittal planes.      FINDINGS:  LUNGS AND AIRWAYS:  Focal consolidation/atelectasis in the lingula with associated air  bronchograms and bronchiectasis similar to prior. Mild focal  consolidation/atelectasis medial middle lobe also similar. There are  multiple at least partially opacified middle lobe and lingular  bronchi grossly similar to prior. There is an approximate 6 mm  pleural-based nodule anteriorly medially on the right image 207 new  since prior. Bibasilar scarring/atelectasis. Grouping of right lower  lobe nodular densities measuring up to 6 mm image 166 with this area  not appearing to be included previously. Partial opacification  involving some right lower lobe bronchi.      No pleural effusions.      MEDIASTINUM AND RUFINO, LOWER NECK AND AXILLA:  The visualized thyroid gland is within normal limits.      Mildly prominent nonspecific paratracheal nodes up to 6 mm short axis.      Esophagus appears within normal limits as seen.      HEART AND VESSELS:  The thoracic aorta is borderline aneurysmal at the ascending segment  up to 4 cm caliber. Mild diffuse prominence descending segment up to  3 cm caliber. Mild vascular calcifications.      Main pulmonary artery and its branches are normal in caliber.      No definite coronary artery calcifications are seen. The study is not  optimized for evaluation of coronary arteries.      The heart appears normal in size.      No significant pericardial effusion.      UPPER ABDOMEN:  Mild low-density adrenal thickening/nodularity particularly on the  left probably due to adenomas and/or hyperplasia. Some  atherosclerotic calcifications visualized abdominal aorta with the  visualized abdominal aorta appearing tortuous.      CHEST WALL AND OSSEOUS STRUCTURES:  There are no definite  "suspicious osseous lesions. Multilevel  degenerative changes visualized spine.      IMPRESSION:  1.  Focal consolidation/atelectasis with associated bronchiectasis at  the lingula and to a lesser extent middle lobe similar to prior.  Multiple at least partially opacified middle lobe and lingular  bronchi as well some right lower lobe bronchi probably sequela of  mucous plugging. Findings could be related to atypical mycobacterial  infection/colonization as noted previously. Grouping of right lower  lobe nodular densities as described could also be of  infectious/inflammatory etiology. Clinical correlation and follow-up  advised.  2. 6 mm anterior right lung nodule for which follow-up to ensure  stability recommended.  3. Bibasilar scarring/atelectasis.  4. Borderline aneurysmal dilatation ascending thoracic aorta up to 4  cm.  5. Additional findings as above.      MACRO:  Incidental Finding:  Multiple solid non-calcified pulmonary nodules  measuring up to 6-8 mm.  (**-YCF-**)      Instructions:  Consider follow up non contrast chest CT at 3-6  months, then consider CT chest at 18-24 months. (Mic Robins et  al., Guidelines for management of incidental pulmonary nodules  detected on CT images: From the Fleischner Society 2017, Radiology.  2017 Jul;284 (1):228-243.) [email or web address]      Signed by: Niraj Rendon 1/16/2024 2:06 PM  Dictation workstation:   [id number]      Echocardiogram     No testing done.       Labwork   Complete Blood Count  Lab Results   Component Value Date    WBC 5.0 11/15/2023    HGB 13.1 11/15/2023    HCT 39.8 11/15/2023    MCV 95 11/15/2023     11/15/2023       Peripheral Eosinophil Count/Percentage:   Eosinophils Absolute (x10*3/uL)   Date Value   11/15/2023 0.09     Eosinophils % (%)   Date Value   11/15/2023 1.8       Serum Immunoglobulin E:    No results found for: \"IGE\"     A1AT genotype:     ASSESSMENT/PLAN   Ms. Pimentel is a 78 y.o. female, who is a never smoker, who " presents to a [facility] for an initial evaluation for Bronchiectasis with acute lower respiratory infection.     Calcium Score: Mild bronchiectasis, mucous plugging and atelectasis noted within  lingula and medial right middle lobe. Correlate with concern for  atypical mycobacterial infection/colonization. Probable scarring.  Immunoglobulins - normal    Problem List and Orders  Problem List Items Addressed This Visit    None    Assessment and Plan / Recommendations:  Problem List Items Addressed This Visit    None     Bronchiectasis on CT scan/ atelectasis vs mucus plugging within  lingula and medial right middle lobe.  - will get PFTs  - will do A1AT testing today - MA forgot will get at next visit  - will order acapella mucus clearing device to try for a couple weeks    Lung nodules; 6mm  - will repeat CT Chest in 3 months       Follow up in 3 months after CT Chest and PFTs or sooner if needed.    If you have any questions please call the office [phone]    Thank you for visiting the Pulmonary clinic today!   Sakshi Vickers CNP  [phone]

## 2024-01-23 ENCOUNTER — PATIENT MESSAGE (OUTPATIENT)
Dept: PRIMARY CARE | Facility: CLINIC | Age: 79
End: 2024-01-23

## 2024-01-23 ENCOUNTER — TELEMEDICINE (OUTPATIENT)
Dept: PULMONOLOGY | Facility: CLINIC | Age: 79
End: 2024-01-23
Payer: MEDICARE

## 2024-01-23 VITALS — WEIGHT: 125 LBS | BODY MASS INDEX: 23 KG/M2 | HEIGHT: 62 IN

## 2024-01-23 DIAGNOSIS — R91.1 LUNG NODULE: ICD-10-CM

## 2024-01-23 DIAGNOSIS — I77.819 AORTIC DILATATION (CMS-HCC): Primary | ICD-10-CM

## 2024-01-23 DIAGNOSIS — J47.9 BRONCHIECTASIS WITHOUT COMPLICATION (MULTI): Primary | ICD-10-CM

## 2024-01-23 PROCEDURE — 99214 OFFICE O/P EST MOD 30 MIN: CPT

## 2024-01-23 ASSESSMENT — PAIN SCALES - GENERAL: PAINLEVEL: 1

## 2024-01-23 ASSESSMENT — PATIENT HEALTH QUESTIONNAIRE - PHQ9
1. LITTLE INTEREST OR PLEASURE IN DOING THINGS: NOT AT ALL
SUM OF ALL RESPONSES TO PHQ9 QUESTIONS 1 AND 2: 0
2. FEELING DOWN, DEPRESSED OR HOPELESS: NOT AT ALL

## 2024-01-23 NOTE — PATIENT COMMUNICATION
Sakshi Vickers, APRN-CNP  You4 days ago       Can you schedule a virtual/telephone visit so I can go over results??         Patient is scheduled today 1/23/24 with Sakshi Vickers at 3:00 pm.

## 2024-02-01 ENCOUNTER — APPOINTMENT (OUTPATIENT)
Dept: RESPIRATORY THERAPY | Facility: HOSPITAL | Age: 79
End: 2024-02-01
Payer: MEDICARE

## 2024-02-05 ENCOUNTER — EVALUATION (OUTPATIENT)
Dept: PHYSICAL THERAPY | Facility: CLINIC | Age: 79
End: 2024-02-05
Payer: MEDICARE

## 2024-02-05 DIAGNOSIS — M62.81 MUSCLE WEAKNESS (GENERALIZED): Chronic | ICD-10-CM

## 2024-02-05 DIAGNOSIS — M54.9 UPPER BACK PAIN: ICD-10-CM

## 2024-02-05 DIAGNOSIS — M54.6 CHRONIC MIDLINE THORACIC BACK PAIN: Primary | ICD-10-CM

## 2024-02-05 DIAGNOSIS — G89.29 CHRONIC MIDLINE THORACIC BACK PAIN: Primary | ICD-10-CM

## 2024-02-05 PROCEDURE — 97110 THERAPEUTIC EXERCISES: CPT | Mod: GP

## 2024-02-05 PROCEDURE — 97161 PT EVAL LOW COMPLEX 20 MIN: CPT | Mod: GP

## 2024-02-05 ASSESSMENT — PAIN SCALES - GENERAL: PAINLEVEL_OUTOF10: 0 - NO PAIN

## 2024-02-05 ASSESSMENT — PAIN - FUNCTIONAL ASSESSMENT: PAIN_FUNCTIONAL_ASSESSMENT: 0-10

## 2024-02-05 ASSESSMENT — PAIN DESCRIPTION - DESCRIPTORS: DESCRIPTORS: ACHING

## 2024-02-05 NOTE — PROGRESS NOTES
Physical Therapy Evaluation and Treatment      Patient Name: Nhung Pimentel  MRN: [id number]  Today's Date: 2/5/2024  Time Calculation  Start Time: 0812  Stop Time: 0845  Time Calculation (min): 33 min    [facility] Cert 2/5/24-5/5/24    Assessment:    Nhung Pimentel  is a 78 y.o. old female presenting to  with c/o  back pain and poor posture consistent with diagnosis of mm weakness 2/2 postural deviations. Pt shows impaired posture, sig increased thoracic kyphosis, decreased postural and UE strength, decreased GH joint mobility and TrP in B MT, rhomboid and UT that are NOT TTP. Functional limitations include gardening, baking, laying on back, getting out of bed from laying on back, laying on stomach. Pt will benefit from skilled PT intervention 1V every other week for 8 weeks to address limitations listed above and achieve desired goals.       Plan:  OP PT Plan  Treatment/Interventions: Cryotherapy, Education/ Instruction, Electrical stimulation, Hot pack, Manual therapy, Neuromuscular re-education, Self care/ home management, Taping techniques, Therapeutic activities, Therapeutic exercises  PT Plan: Skilled PT  PT Frequency:  (1V every other week)  Duration: 8 weeks  Onset Date: 02/05/19  Certification Period Start Date: 02/05/24  Certification Period End Date: 05/05/24  Number of Treatments Authorized: [facility]  Rehab Potential: Excellent  Plan of Care Agreement: Patient    NV: core and postural strengthening     Current Problem:   1. Chronic midline thoracic back pain        2. Upper back pain  Referral to Physical Therapy      3. Muscle weakness (generalized)            Subjective    General:  General  Reason for Referral: Upper back pain (M54.9)  Referred By: Rachel Stewart MD  Upper back pain    Cannot lay on back 2/2 curving   Reports she has never had good posture     Date of Onset: a long time     Pain (0-10)         Location: Upper back between shoulder blade (achy soreness)         Best: 0/10 ice, sitting,  stretching         Worst: 2/10 baking, standing long periods, laying on back, getting up from laying on back, gardening         Current: 0/10         Imaging: Y- xrays in November show mild degenerative changes of thoracic and lumbar spine with disc height loss and osteophytes. Mild S shaped thoracolumbar scoliosis and exaggerated kyphosis in thoracic spine present.   Numbness/Tingling: Denies     Prior Health History:    Injuries: denies    Health Conditions: Family hx of heart attacks in family and found pneumonia in lungs and has been treated for. 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     Precautions:  Precautions  Precautions Comment: NONE  Pain:  Pain Assessment  Pain Assessment: 0-10  Pain Score: 0 - No pain (0 current / 2 at worst)  Pain Type: Chronic pain  Pain Location: Back  Pain Orientation: Upper  Pain Descriptors: Aching    Objective   Posture: Forward head, rounded shoulders increased kyphosis of Tspine     ROM:  WNL cervical, shoulder, elbow, wrist and hand ROM     Strength   - Shoulder Flexion                    L     4 +     R  4 +  - Shoulder Extension               L      5    R5  - Shoulder ABD                       L      4    R 4-  - Shoulder ER                          L    4      R4  - Shoulder IR                           L       4+   R4+  - Elbow flexion                        L      4    R 4  - Elbow extension                   L       4   R4    Postural muscle strength: (in sitting 2/2 discomfort laying prone)   - LT                                      L    4      R4  - MT                                    L      4    R4  - Rhomboids                       L     4     R4  - Latissimus Dorsi               L 4         R4      Palpation:   TrP in B MT, rhomboid and UT that are NOT TTP     Joint mobility: TrP in B MT, rhomboid, UT, posterior RTC that are not TTP     Outcome Measures:  Other Measures  Oswestry Disablity Index (JAYESH): 0     Treatments:  Therex (15 min)   Access Code: [email or web address]  URL: [email or web address]  Date: 02/05/2024  Prepared by: Iona Zhao    Exercises  - Seated Scapular Retraction  - 1 x daily - 7 x weekly - 3 sets - 10 reps - 3-5sec hold  - Seated Diaphragmatic Breathing  - 1 x daily - 7 x weekly - 3 sets - 10 reps  - Seated Shoulder Horizontal Abduction with Resistance - Palms Down  - 1 x daily - 7 x weekly - 3 sets - 10 reps  - Shoulder External Rotation and Scapular Retraction with Resistance  - 1 x daily - 7 x weekly - 3 sets - 10 reps  - Single Arm Doorway Pec Stretch at 90 Degrees Abduction  - 1 x daily - 7 x weekly - 2 sets - 30sec hold    EDUCATION:   Pt educated on findings of IE, plan of care, HEP and goals to promote pt progress for reduced s/s.    Goals:    STGs: 4 weeks     Pt will demonstrate good posture with <2 cues for correction during 45 minute treatment session in order to enhance body mechanics with ADLs, functional mobility, and recreational/occupational duties.    Pt will demonstrate independence and report compliance with HEP to facilitate independent rehab program upon discharge.    LTGs: 8 weeks     Pt will demonstrate increased strength in deficient muscles by 1/2 MMT grade to assist with improved posture and reduced neck pain.     Pt will demonstrate improved postural mm strength by 1/2 MMT grade to allow for ability to bake and garden without pain.     Pt will report 75% improvement in back pain for improved QOL.

## 2024-02-20 ENCOUNTER — TREATMENT (OUTPATIENT)
Dept: PHYSICAL THERAPY | Facility: CLINIC | Age: 79
End: 2024-02-20
Payer: MEDICARE

## 2024-02-20 DIAGNOSIS — M54.6 CHRONIC MIDLINE THORACIC BACK PAIN: Primary | ICD-10-CM

## 2024-02-20 DIAGNOSIS — G89.29 CHRONIC MIDLINE THORACIC BACK PAIN: Primary | ICD-10-CM

## 2024-02-20 DIAGNOSIS — M62.81 MUSCLE WEAKNESS (GENERALIZED): Chronic | ICD-10-CM

## 2024-02-20 DIAGNOSIS — M54.9 UPPER BACK PAIN: ICD-10-CM

## 2024-02-20 PROCEDURE — 97110 THERAPEUTIC EXERCISES: CPT | Mod: GP

## 2024-02-20 NOTE — PROGRESS NOTES
Physical Therapy Evaluation and Treatment      Patient Name: Nhung Pimentel  MRN: [id number]  Today's Date: 2/20/2024  Time Calculation  Start Time: 1130  Stop Time: 1215  Time Calculation (min): 45 min    MCR Cert 2/5/24-5/5/24    Assessment:   Pt tolerated therex well today with incorporating more hip/core strength to stabilize lumbopelvic region. Pt demos asymmetric pelvic alignment contributing to increased lumbothoracic pain that slightly improved with MET to correct.       Plan:   Pelvic alignment?   Postural and core strengthening   Lifting/bending mechanics   Manual as needed     Current Problem:   1. Chronic midline thoracic back pain        2. Upper back pain  Follow Up In Physical Therapy      3. Muscle weakness (generalized)  Follow Up In Physical Therapy            Subjective    General:   Upper back pain: Reports no back pain today. Tweaked back while putting on compression stockings. Reports exercises are going well.   Pt reports she gets more lower thoracic/lumbar back pain when laying on back.     Precautions:   NONE     Objective   Increased time needed for bed mobility 2/2 back pain - with ed on abdominal bracing and rolling decreased pain reported.     Treatments:    Manual: 5 min   Assessment of pelvic alignment with MET to correct:   - L anterior pelvic rotation with + response noted       Therex (38 min)   Hip ADD isometric 10 sec hold x10   Hip ABD isometric 10 sec hold  x10   TA activation with exhale x10 with frequent VC  PPT x10 with abdominal brace   Bridge x5   Bridge with GTB around knees and TA brace x10   HL lumbar rotation x10   SL thoracic rotation x10   Seated Ts with GTB x10   Seated Ws with GTB x10     Education on self MET technique.       HEP:   Access Code: [email or web address]  URL: [email or web address]  Date: 02/05/2024  Prepared by: Iona Zhao    Exercises  - Seated Scapular Retraction  - 1 x daily - 7 x weekly - 3 sets - 10 reps - 3-5sec hold  - Seated  Diaphragmatic Breathing  - 1 x daily - 7 x weekly - 3 sets - 10 reps  - Seated Shoulder Horizontal Abduction with Resistance - Palms Down  - 1 x daily - 7 x weekly - 3 sets - 10 reps  - Shoulder External Rotation and Scapular Retraction with Resistance  - 1 x daily - 7 x weekly - 3 sets - 10 reps  - Single Arm Doorway Pec Stretch at 90 Degrees Abduction  - 1 x daily - 7 x weekly - 2 sets - 30sec hold

## 2024-02-21 ENCOUNTER — APPOINTMENT (OUTPATIENT)
Dept: PHYSICAL THERAPY | Facility: CLINIC | Age: 79
End: 2024-02-21
Payer: MEDICARE

## 2024-03-05 ENCOUNTER — APPOINTMENT (OUTPATIENT)
Dept: PULMONOLOGY | Facility: CLINIC | Age: 79
End: 2024-03-05
Payer: MEDICARE

## 2024-03-06 ENCOUNTER — TREATMENT (OUTPATIENT)
Dept: PHYSICAL THERAPY | Facility: CLINIC | Age: 79
End: 2024-03-06
Payer: MEDICARE

## 2024-03-06 DIAGNOSIS — M62.81 MUSCLE WEAKNESS (GENERALIZED): Chronic | ICD-10-CM

## 2024-03-06 DIAGNOSIS — M54.9 UPPER BACK PAIN: ICD-10-CM

## 2024-03-06 PROCEDURE — 97140 MANUAL THERAPY 1/> REGIONS: CPT | Mod: CQ,GP

## 2024-03-06 PROCEDURE — 97110 THERAPEUTIC EXERCISES: CPT | Mod: GP,CQ

## 2024-03-06 ASSESSMENT — PAIN - FUNCTIONAL ASSESSMENT: PAIN_FUNCTIONAL_ASSESSMENT: 0-10

## 2024-03-06 ASSESSMENT — PAIN SCALES - GENERAL: PAINLEVEL_OUTOF10: 0 - NO PAIN

## 2024-03-06 ASSESSMENT — PAIN DESCRIPTION - DESCRIPTORS: DESCRIPTORS: ACHING

## 2024-03-06 NOTE — PROGRESS NOTES
Physical Therapy Treatment    Patient Name: Nhung Pimentel  MRN: [id number]  Today's Date: 3/6/2024  Time Calculation  Start Time: 0835  Stop Time: 0915  Time Calculation (min): 40 min  PT Therapeutic Procedures Time Entry  Manual Therapy Time Entry: 10  Therapeutic Exercise Time Entry: 30     Insurance   Visit #3  MCR Cert 2/5/24-5/5/24   Current Problem:  1. Upper back pain  Follow Up In Physical Therapy      2. Muscle weakness (generalized)  Follow Up In Physical Therapy        Subjective:  I feel ok, I am working on my exercises and they are going ok.   Pain:  Pain Assessment: 0-10  Pain Score: 0 - No pain  Pain Type: Chronic pain  Pain Location: Back  Pain Orientation: Upper  Pain Descriptors: Aching    Objective:  Precautions  Precautions  Precautions Comment: NONE    Treatments:  Therapeutic Exercise 70617: Unit(s)-2  SL Alejandro stretch 1x10 R/L  Standing Row 1x10 vs red band  Seated scapular retraction with ER-review with red band x 10  Seated T-spine extension stretch in chair x 10' x5  Seated AROM-shoulder flexion x 10  SA wall slide with towel x10  Seated Posture correction with pillow on lap.  Instructed on supine to sit bed mobility and transfer via log roll.  Manual Therapy 34591: Unit(s) -1  MFR- cross hand release to R/L Thoracic region  Assessment: Able to tolerate session without increase of symptoms. Improved exercise technique following review of home exercise program.  Plan: Continue to progress mobility and scapular and core stability.

## 2024-03-19 ENCOUNTER — TREATMENT (OUTPATIENT)
Dept: PHYSICAL THERAPY | Facility: CLINIC | Age: 79
End: 2024-03-19
Payer: MEDICARE

## 2024-03-19 DIAGNOSIS — M54.9 UPPER BACK PAIN: ICD-10-CM

## 2024-03-19 DIAGNOSIS — M54.6 CHRONIC MIDLINE THORACIC BACK PAIN: Primary | ICD-10-CM

## 2024-03-19 DIAGNOSIS — G89.29 CHRONIC MIDLINE THORACIC BACK PAIN: Primary | ICD-10-CM

## 2024-03-19 DIAGNOSIS — M62.81 MUSCLE WEAKNESS (GENERALIZED): Chronic | ICD-10-CM

## 2024-03-19 PROCEDURE — 97110 THERAPEUTIC EXERCISES: CPT | Mod: GP

## 2024-03-19 PROCEDURE — 97140 MANUAL THERAPY 1/> REGIONS: CPT | Mod: GP

## 2024-03-19 PROCEDURE — 97530 THERAPEUTIC ACTIVITIES: CPT | Mod: GP

## 2024-03-19 NOTE — PROGRESS NOTES
Physical Therapy Treatment    Patient Name: Nhung Pimentel  MRN: [id number]  Today's Date: 3/19/2024  Time Calculation  Start Time: 0900  Stop Time: 0945  Time Calculation (min): 45 min  PT Therapeutic Procedures Time Entry  Manual Therapy Time Entry: 15  Therapeutic Exercise Time Entry: 20  Therapeutic Activity Time Entry: 10     Insurance   Visit #4  MCR Cert 2/5/24-5/5/24     Current Problem:  1. Chronic midline thoracic back pain        2. Upper back pain  Follow Up In Physical Therapy      3. Muscle weakness (generalized)  Follow Up In Physical Therapy          Subjective:  Having a little LBP 4/10 and R hip pain that increases with HL lumbar rotation.   Heat from shower feels good on hip.   Reports a little bit of upper back pain but after taking breaks it gets better.     Pt is hoping the improves her posture to reduce stooping over.       Objective:  Scoliosis S curve present:   Thoracic Levoscoliosis  Lumbar dextroscoliosis     Precautions       Treatments:  Therapeutic Exercise 95242: 20 min   UBE level 2 x5 min   Standing R sidebend to shorten R QL   R hip hike x10  R sided QL shortening SB with L sided lengthening x10   R sided shoulder cross body stretch x30sec   L sided single arm row with scap retraction x10   L sided shoulder ext with scap retraction x10   Standing posture correction       Scoliosis specific HEP Access Code: [email or web address]      Manual Therapy 90686: Unit(s) -15 min   DTM to R QL, lumbar paraspinals and obliques to reduce mm spasm and pain.     TherAct: 10 min   Standing posture assessment to assess scoliosis curve. See objective section for findings.   Pt educated on anatomy of muscle lengthening/shortening in relation to her S curve including  Thoracic Levoscoliosis:   - L sided thoracic mm lengthening   - R sided thoracic mm shortening   Lumbar dextroscoliosis   - L sided lumbar mm shortening   - R sided lumbar mm lengthening     Pt educated on avoiding postures that enhance  concave spinal curvatures.     Assessment: Pt tolerated session well with focus on scoliosis focused strengthening/lengthening to normalize mm patterns. Pt reports reduced pain and improved bed mobility after manual therapy.     Plan: Scoliosis focused strengthening / lengthening

## 2024-04-01 ENCOUNTER — TREATMENT (OUTPATIENT)
Dept: PHYSICAL THERAPY | Facility: CLINIC | Age: 79
End: 2024-04-01
Payer: MEDICARE

## 2024-04-01 DIAGNOSIS — M54.9 UPPER BACK PAIN: ICD-10-CM

## 2024-04-01 DIAGNOSIS — M62.81 MUSCLE WEAKNESS (GENERALIZED): Chronic | ICD-10-CM

## 2024-04-01 PROCEDURE — 97140 MANUAL THERAPY 1/> REGIONS: CPT | Mod: GP

## 2024-04-01 PROCEDURE — 97110 THERAPEUTIC EXERCISES: CPT | Mod: GP

## 2024-04-01 NOTE — PROGRESS NOTES
Physical Therapy Treatment    Patient Name: Nhung Pimentel  MRN: [id number]  Today's Date: 4/1/2024  Time Calculation  Start Time: 0802  Stop Time: 0845  Time Calculation (min): 43 min  PT Therapeutic Procedures Time Entry  Manual Therapy Time Entry: 15  Therapeutic Exercise Time Entry: 28     Insurance   Visit #5  MCR Cert 2/5/24-5/5/24     Current Problem:  1. Upper back pain  Follow Up In Physical Therapy      2. Muscle weakness (generalized)  Follow Up In Physical Therapy            Subjective:    Felt a lot of soreness after gardening over the weekend. Feeling okay in the back today. Denies any back or hip pain. Had a lot of leg cramps overnight.       Objective:  Scoliosis S curve present:   Thoracic Levoscoliosis   Lumbar dextroscoliosis     Precautions   NONE     Treatments:  Therapeutic Exercise 42731: 28 min   LBP strengthening: Access Code: [email or web address]  Scoliosis specific HEP Access Code: [email or web address]    Bridge on heels x10   Bridge with with ADD x10   HL clamshells x10 with GTB   TA activation with exhale x10   R sidebend to promote lengthening of L QL and obliques x10   Lumbothoracic shifting to promote normalized spine alignment. X10     Manual Therapy 07367: Unit(s) -15 min   TrP to R QL, lumbar paraspinals and obliques to reduce mm spasm and pain.   Grade 3/4 PA thoracic joint mobilizations to promote increased thoracic mobility and reduction of pain.     Assessment: Pt tolerated manual therapy well focusing on improved thoracic mobility. Pt shows improved bed mobility at end of session after hip strengthening. HEP updated and given hip/core strength to reduce LBP.     Plan: HOLD x30 days then DC

## 2024-04-16 ENCOUNTER — APPOINTMENT (OUTPATIENT)
Dept: RADIOLOGY | Facility: CLINIC | Age: 79
End: 2024-04-16
Payer: MEDICARE

## 2024-04-23 ENCOUNTER — APPOINTMENT (OUTPATIENT)
Dept: PULMONOLOGY | Facility: CLINIC | Age: 79
End: 2024-04-23
Payer: MEDICARE

## 2024-11-13 ENCOUNTER — APPOINTMENT (OUTPATIENT)
Dept: PRIMARY CARE | Facility: CLINIC | Age: 79
End: 2024-11-13
Payer: MEDICARE

## 2024-11-13 ENCOUNTER — LAB (OUTPATIENT)
Dept: LAB | Facility: LAB | Age: 79
End: 2024-11-13
Payer: MEDICARE

## 2024-11-13 ENCOUNTER — TELEPHONE (OUTPATIENT)
Dept: PRIMARY CARE | Facility: CLINIC | Age: 79
End: 2024-11-13

## 2024-11-13 VITALS
BODY MASS INDEX: 23.15 KG/M2 | OXYGEN SATURATION: 97 % | HEIGHT: 62 IN | SYSTOLIC BLOOD PRESSURE: 114 MMHG | TEMPERATURE: 97.5 F | WEIGHT: 125.8 LBS | DIASTOLIC BLOOD PRESSURE: 72 MMHG | HEART RATE: 69 BPM

## 2024-11-13 DIAGNOSIS — E78.5 HYPERLIPIDEMIA, UNSPECIFIED HYPERLIPIDEMIA TYPE: ICD-10-CM

## 2024-11-13 DIAGNOSIS — D12.6 TUBULAR ADENOMA OF COLON: ICD-10-CM

## 2024-11-13 DIAGNOSIS — Z12.31 VISIT FOR SCREENING MAMMOGRAM: ICD-10-CM

## 2024-11-13 DIAGNOSIS — E55.9 VITAMIN D DEFICIENCY: ICD-10-CM

## 2024-11-13 DIAGNOSIS — Z00.00 ROUTINE HEALTH MAINTENANCE: ICD-10-CM

## 2024-11-13 DIAGNOSIS — Z00.00 ROUTINE GENERAL MEDICAL EXAMINATION AT HEALTH CARE FACILITY: ICD-10-CM

## 2024-11-13 DIAGNOSIS — R53.83 OTHER FATIGUE: ICD-10-CM

## 2024-11-13 DIAGNOSIS — Z12.11 SCREENING FOR COLON CANCER: ICD-10-CM

## 2024-11-13 DIAGNOSIS — Z00.00 MEDICARE ANNUAL WELLNESS VISIT, SUBSEQUENT: Primary | ICD-10-CM

## 2024-11-13 DIAGNOSIS — E78.49 OTHER HYPERLIPIDEMIA: ICD-10-CM

## 2024-11-13 DIAGNOSIS — Z00.00 ROUTINE HEALTH MAINTENANCE: Primary | ICD-10-CM

## 2024-11-13 LAB
25(OH)D3 SERPL-MCNC: 42 NG/ML (ref 30–100)
ABO GROUP (TYPE) IN BLOOD: NORMAL
ALBUMIN SERPL BCP-MCNC: 4.5 G/DL (ref 3.4–5)
ALP SERPL-CCNC: 71 U/L (ref 33–136)
ALT SERPL W P-5'-P-CCNC: 14 U/L (ref 7–45)
ANION GAP SERPL CALC-SCNC: 17 MMOL/L (ref 10–20)
ANTIBODY SCREEN: NORMAL
AST SERPL W P-5'-P-CCNC: 25 U/L (ref 9–39)
BILIRUB SERPL-MCNC: 0.9 MG/DL (ref 0–1.2)
BUN SERPL-MCNC: 14 MG/DL (ref 6–23)
CALCIUM SERPL-MCNC: 9.8 MG/DL (ref 8.6–10.6)
CHLORIDE SERPL-SCNC: 101 MMOL/L (ref 98–107)
CHOLEST SERPL-MCNC: 241 MG/DL (ref 0–199)
CHOLESTEROL/HDL RATIO: 3.4
CO2 SERPL-SCNC: 28 MMOL/L (ref 21–32)
CREAT SERPL-MCNC: 0.81 MG/DL (ref 0.5–1.05)
EGFRCR SERPLBLD CKD-EPI 2021: 75 ML/MIN/1.73M*2
ERYTHROCYTE [DISTWIDTH] IN BLOOD BY AUTOMATED COUNT: 13.4 % (ref 11.5–14.5)
GLUCOSE SERPL-MCNC: 83 MG/DL (ref 74–99)
HCT VFR BLD AUTO: 42.1 % (ref 36–46)
HDLC SERPL-MCNC: 69.9 MG/DL
HGB BLD-MCNC: 13.4 G/DL (ref 12–16)
LDLC SERPL CALC-MCNC: 150 MG/DL
MCH RBC QN AUTO: 30.5 PG (ref 26–34)
MCHC RBC AUTO-ENTMCNC: 31.8 G/DL (ref 32–36)
MCV RBC AUTO: 96 FL (ref 80–100)
NON HDL CHOLESTEROL: 171 MG/DL (ref 0–149)
NRBC BLD-RTO: 0 /100 WBCS (ref 0–0)
PLATELET # BLD AUTO: 272 X10*3/UL (ref 150–450)
POTASSIUM SERPL-SCNC: 4.9 MMOL/L (ref 3.5–5.3)
PROT SERPL-MCNC: 7.4 G/DL (ref 6.4–8.2)
RBC # BLD AUTO: 4.39 X10*6/UL (ref 4–5.2)
RH FACTOR (ANTIGEN D): NORMAL
SODIUM SERPL-SCNC: 141 MMOL/L (ref 136–145)
TRIGL SERPL-MCNC: 105 MG/DL (ref 0–149)
TSH SERPL-ACNC: 3 MIU/L (ref 0.44–3.98)
VLDL: 21 MG/DL (ref 0–40)
WBC # BLD AUTO: 4.9 X10*3/UL (ref 4.4–11.3)

## 2024-11-13 PROCEDURE — 82306 VITAMIN D 25 HYDROXY: CPT

## 2024-11-13 PROCEDURE — 85027 COMPLETE CBC AUTOMATED: CPT

## 2024-11-13 PROCEDURE — 80053 COMPREHEN METABOLIC PANEL: CPT

## 2024-11-13 PROCEDURE — 1157F ADVNC CARE PLAN IN RCRD: CPT | Performed by: INTERNAL MEDICINE

## 2024-11-13 PROCEDURE — 1170F FXNL STATUS ASSESSED: CPT | Performed by: INTERNAL MEDICINE

## 2024-11-13 PROCEDURE — 84443 ASSAY THYROID STIM HORMONE: CPT

## 2024-11-13 PROCEDURE — 93000 ELECTROCARDIOGRAM COMPLETE: CPT | Performed by: INTERNAL MEDICINE

## 2024-11-13 PROCEDURE — 1160F RVW MEDS BY RX/DR IN RCRD: CPT | Performed by: INTERNAL MEDICINE

## 2024-11-13 PROCEDURE — 86900 BLOOD TYPING SEROLOGIC ABO: CPT

## 2024-11-13 PROCEDURE — 86850 RBC ANTIBODY SCREEN: CPT

## 2024-11-13 PROCEDURE — G0439 PPPS, SUBSEQ VISIT: HCPCS | Performed by: INTERNAL MEDICINE

## 2024-11-13 PROCEDURE — 80061 LIPID PANEL: CPT

## 2024-11-13 PROCEDURE — 86901 BLOOD TYPING SEROLOGIC RH(D): CPT

## 2024-11-13 PROCEDURE — 36415 COLL VENOUS BLD VENIPUNCTURE: CPT

## 2024-11-13 PROCEDURE — 1159F MED LIST DOCD IN RCRD: CPT | Performed by: INTERNAL MEDICINE

## 2024-11-13 PROCEDURE — 99214 OFFICE O/P EST MOD 30 MIN: CPT | Performed by: INTERNAL MEDICINE

## 2024-11-13 ASSESSMENT — ACTIVITIES OF DAILY LIVING (ADL)
BATHING: INDEPENDENT
TAKING_MEDICATION: INDEPENDENT
MANAGING_FINANCES: INDEPENDENT
GROCERY_SHOPPING: INDEPENDENT
DOING_HOUSEWORK: INDEPENDENT
DRESSING: INDEPENDENT

## 2024-11-13 NOTE — TELEPHONE ENCOUNTER
lab called- Stated pt requested the blood work lab that will test pt blood type.  lab said that lab was not in the system

## 2024-11-13 NOTE — PROGRESS NOTES
"Subjective   Patient ID: Nhung Pimentel is a 78 y.o. female who presents for Medicare Annual Wellness Visit Subsequent (Patient is here for an annual Medicare wellness visit and follow up. ) and Follow-up.    This is a 78 year old pt here for subsequent MCR,,f/u on HLD .  She saw pulmonologist ,report reviewed,denies any cough or sputum production.  she has achy legs and varicoses of legs.  she has \"bad posture\".  She tried to wear back brace but it caused some discomfort last DEXA look okay from last years.  she had covid in August 2022,mild case.   Last hector 12/12/2023  Last DEXA 12/1/22.  Last colonoscopy 11/4/2019,next in 5 years due to h/o adenoma,will order  one for this year.  She has not frequent oral herpes and occasional rash when working in the yard and she needs refill on both prescription for topical steroid and oral Zovirax.  She eats healthy in general and stay active denies any chest pain or shortness of breath.         Review of Systems   Constitutional: Negative.    HENT: Negative.     Eyes: Negative.    Respiratory: Negative.     Cardiovascular: Negative.    Gastrointestinal: Negative.    Genitourinary: Negative.    Neurological: Negative.    Hematological: Negative.    Psychiatric/Behavioral: Negative.         Objective   /72 (BP Location: Left arm, Patient Position: Sitting, BP Cuff Size: Adult)   Pulse 69   Temp 36.4 °C (97.5 °F) (Temporal)   Ht 1.562 m (5' 1.5\")   Wt 57.1 kg (125 lb 12.8 oz)   SpO2 97%   BMI 23.38 kg/m²     Physical Exam  Vitals reviewed.   Constitutional:       Appearance: Normal appearance.   HENT:      Head: Normocephalic and atraumatic.      Right Ear: Tympanic membrane and ear canal normal.      Left Ear: Tympanic membrane and ear canal normal.   Eyes:      Extraocular Movements: Extraocular movements intact.      Pupils: Pupils are equal, round, and reactive to light.   Neck:      Vascular: No carotid bruit.   Cardiovascular:      Rate and Rhythm: Normal " rate and regular rhythm.      Pulses: Normal pulses.      Heart sounds: Normal heart sounds. No murmur heard.     Comments: Bilateral dilated varicose veins on both legs worse on the right side.  Pulmonary:      Effort: Pulmonary effort is normal.      Breath sounds: Normal breath sounds.   Abdominal:      General: Abdomen is flat. Bowel sounds are normal.      Palpations: Abdomen is soft.   Musculoskeletal:      Cervical back: Normal range of motion and neck supple.      Right lower leg: No edema.      Left lower leg: No edema.      Comments: Back kyphosis.  Limited range of motion of both hips worse on the right hip.   Lymphadenopathy:      Cervical: No cervical adenopathy.   Skin:     Comments: Supportive keratosis on back.   Neurological:      General: No focal deficit present.      Mental Status: She is alert and oriented to person, place, and time.   Psychiatric:         Mood and Affect: Mood normal.         Assessment/Plan   Problem List Items Addressed This Visit             ICD-10-CM    Other fatigue R53.83    Relevant Orders    CBC (Completed)    Comprehensive Metabolic Panel (Completed)    Tubular adenoma of colon D12.6    Medicare annual wellness visit, subsequent - Primary Z00.00    Other hyperlipidemia E78.49    Hyperlipidemia E78.5    Relevant Orders    ECG 12 lead (Clinic Performed) (Completed)    Lipid Panel (Completed)    TSH with reflex to Free T4 if abnormal (Completed)    Visit for screening mammogram Z12.31    Relevant Orders    BI mammo bilateral screening tomosynthesis    Vitamin D deficiency E55.9    Relevant Orders    Vitamin D 25-Hydroxy,Total (for eval of Vitamin D levels) (Completed)    Screening for colon cancer Z12.11    Relevant Orders    Colonoscopy Screening; High Risk Patient; h/o colon polyp.     Other Visit Diagnoses         Codes    Routine general medical examination at health care facility     Z00.00    Relevant Orders    1 Year Follow Up In [facility] - PCP -  Wellness Exam

## 2024-11-17 PROBLEM — Z12.11 SCREENING FOR COLON CANCER: Status: ACTIVE | Noted: 2024-11-17

## 2024-11-17 ASSESSMENT — ENCOUNTER SYMPTOMS
GASTROINTESTINAL NEGATIVE: 1
EYES NEGATIVE: 1
CARDIOVASCULAR NEGATIVE: 1
CONSTITUTIONAL NEGATIVE: 1
PSYCHIATRIC NEGATIVE: 1
RESPIRATORY NEGATIVE: 1
NEUROLOGICAL NEGATIVE: 1
HEMATOLOGIC/LYMPHATIC NEGATIVE: 1

## 2025-01-07 ENCOUNTER — HOSPITAL ENCOUNTER (OUTPATIENT)
Dept: RADIOLOGY | Facility: CLINIC | Age: 80
Discharge: HOME | End: 2025-01-07
Payer: MEDICARE

## 2025-01-07 VITALS — HEIGHT: 61 IN | WEIGHT: 121 LBS | BODY MASS INDEX: 22.84 KG/M2

## 2025-01-07 DIAGNOSIS — Z12.31 VISIT FOR SCREENING MAMMOGRAM: ICD-10-CM

## 2025-01-07 PROCEDURE — 77063 BREAST TOMOSYNTHESIS BI: CPT | Performed by: RADIOLOGY

## 2025-01-07 PROCEDURE — 77067 SCR MAMMO BI INCL CAD: CPT

## 2025-01-07 PROCEDURE — 77067 SCR MAMMO BI INCL CAD: CPT | Performed by: RADIOLOGY

## 2025-03-04 ENCOUNTER — TELEPHONE (OUTPATIENT)
Dept: PRIMARY CARE | Facility: CLINIC | Age: 80
End: 2025-03-04

## 2025-03-04 ENCOUNTER — TELEMEDICINE (OUTPATIENT)
Dept: PRIMARY CARE | Facility: CLINIC | Age: 80
End: 2025-03-04
Payer: MEDICARE

## 2025-03-04 DIAGNOSIS — U07.1 RESPIRATORY TRACT INFECTION DUE TO COVID-19 VIRUS: Primary | ICD-10-CM

## 2025-03-04 DIAGNOSIS — J98.8 RESPIRATORY TRACT INFECTION DUE TO COVID-19 VIRUS: Primary | ICD-10-CM

## 2025-03-04 DIAGNOSIS — J84.115 RESPIRATORY BRONCHIOLITIS INTERSTITIAL LUNG DISEASE (MULTI): ICD-10-CM

## 2025-03-04 PROCEDURE — 99213 OFFICE O/P EST LOW 20 MIN: CPT | Performed by: INTERNAL MEDICINE

## 2025-03-04 PROCEDURE — 1157F ADVNC CARE PLAN IN RCRD: CPT | Performed by: INTERNAL MEDICINE

## 2025-03-04 PROCEDURE — 1159F MED LIST DOCD IN RCRD: CPT | Performed by: INTERNAL MEDICINE

## 2025-03-04 PROCEDURE — G2211 COMPLEX E/M VISIT ADD ON: HCPCS | Performed by: INTERNAL MEDICINE

## 2025-03-04 PROCEDURE — 1036F TOBACCO NON-USER: CPT | Performed by: INTERNAL MEDICINE

## 2025-03-04 ASSESSMENT — ENCOUNTER SYMPTOMS
SORE THROAT: 1
VOMITING: 0
CHILLS: 1
WHEEZING: 0
SHORTNESS OF BREATH: 0
ROS GI COMMENTS: AS HPI.
RHINORRHEA: 1
MUSCULOSKELETAL NEGATIVE: 1
FEVER: 0
DIARRHEA: 0

## 2025-03-04 NOTE — ASSESSMENT & PLAN NOTE
Rest,push fluid,monitor pulse ox closely,go to ER if below 90 % or if short of breath.  Follow CDC recommendation regarding guidelines isolation and recommendation.  Call if not better.  Add Flonase nasal spray for nasal congestion as needed.  Can Tylenol for pain and fever and Mucinex DM for cough as needed.  She had covid vaccine last September,she is traveling in May,asking if she need a booster,I told her she need to wait 90 days prior to getting covid,she now has natural immunity.  Her covid symptoms are mild and she started feeling better today,will not treat with Paxlovid,concerned about possible rebound.

## 2025-03-04 NOTE — PROGRESS NOTES
Subjective   Patient ID: Nhung Pimentel is a 79 y.o. female who presents for COVID positive  (Patient tested positive for COVID last night. Patient says that she started with a sore throat Sunday morning. Patient says that last night she started experiencing chills, bad headache, coughing, and sneezing. Patient would like to avoid taking any medications if possible. ).    An interactive audio and video telecommunication system with patient real time communications between the patient (at the original site) and provider (at the distant site) was utilized to provide this telehealth service.  Verbal consent was requested and obtained from the patient at this date for a telehealth.  We attempted a video but pt had difficulty connecting to a video  Pt seen because she tested positive for covid yesterday,she started with a sore throat on 3/2/25,slight nasal congestion and slight dry cough,she had some dry heaves,but resolved,has headache responding to Tylenol,last night she had some chills,but resolved,she feels better today.         Review of Systems   Constitutional:  Positive for chills. Negative for fever.   HENT:  Positive for congestion, rhinorrhea and sore throat. Negative for ear pain.    Respiratory:  Negative for shortness of breath and wheezing.    Gastrointestinal:  Negative for diarrhea and vomiting.        As HPI.   Musculoskeletal: Negative.      As HPI.    Objective   There were no vitals taken for this visit.    Physical Exam  Constitutional:       General: She is not in acute distress.  Pulmonary:      Effort: No respiratory distress.   Neurological:      Mental Status: She is alert.         Assessment/Plan   Problem List Items Addressed This Visit             ICD-10-CM    Bronchiectasis with acute lower respiratory infection (Multi) J47.0     Seen by pulmonologistmalena.         Respiratory tract infection due to COVID-19 virus - Primary U07.1, J98.8     Rest,push fluid,monitor pulse ox closely,go to  ER if below 90 % or if short of breath.  Follow CDC recommendation regarding guidelines isolation and recommendation.  Call if not better.  Add Flonase nasal spray for nasal congestion as needed.  Can Tylenol for pain and fever and Mucinex DM for cough as needed.  She had covid vaccine last September,she is traveling in May,asking if she need a booster,I told her she need to wait 90 days prior to getting covid,she now has natural immunity.  Her covid symptoms are mild and she started feeling better today,will not treat with Paxlovid,concerned about possible rebound.

## 2025-03-04 NOTE — TELEPHONE ENCOUNTER
Patient tested positive for COVID with a home test last night. Patient states that she would like the medication. Patient was advised that  Would need to do a virtual appt in order to prescribe anything. Patient states that she doesn't know how to do a virtual, it would have to be a phone call. Please call to see if this is an option for today.

## 2025-07-03 ENCOUNTER — LAB REQUISITION (OUTPATIENT)
Dept: LAB | Facility: HOSPITAL | Age: 80
End: 2025-07-03

## 2025-07-03 ENCOUNTER — OFFICE VISIT (OUTPATIENT)
Dept: PRIMARY CARE | Facility: CLINIC | Age: 80
End: 2025-07-03
Payer: MEDICARE

## 2025-07-03 ENCOUNTER — HOSPITAL ENCOUNTER (OUTPATIENT)
Dept: RADIOLOGY | Facility: CLINIC | Age: 80
Discharge: HOME | End: 2025-07-03
Payer: MEDICARE

## 2025-07-03 VITALS
WEIGHT: 118.4 LBS | BODY MASS INDEX: 22.37 KG/M2 | TEMPERATURE: 97 F | OXYGEN SATURATION: 95 % | SYSTOLIC BLOOD PRESSURE: 104 MMHG | HEART RATE: 99 BPM | DIASTOLIC BLOOD PRESSURE: 65 MMHG

## 2025-07-03 DIAGNOSIS — R05.9 COUGH, UNSPECIFIED TYPE: ICD-10-CM

## 2025-07-03 DIAGNOSIS — R06.00 DYSPNEA, UNSPECIFIED TYPE: Primary | ICD-10-CM

## 2025-07-03 DIAGNOSIS — R06.00 DYSPNEA, UNSPECIFIED: ICD-10-CM

## 2025-07-03 DIAGNOSIS — J06.9 URTI (ACUTE UPPER RESPIRATORY INFECTION): ICD-10-CM

## 2025-07-03 DIAGNOSIS — J47.9 BRONCHIECTASIS WITHOUT COMPLICATION (MULTI): ICD-10-CM

## 2025-07-03 LAB — D DIMER PPP FEU-MCNC: 1068 NG/ML FEU

## 2025-07-03 PROCEDURE — 71046 X-RAY EXAM CHEST 2 VIEWS: CPT

## 2025-07-03 PROCEDURE — 1036F TOBACCO NON-USER: CPT | Performed by: INTERNAL MEDICINE

## 2025-07-03 PROCEDURE — G2211 COMPLEX E/M VISIT ADD ON: HCPCS | Performed by: INTERNAL MEDICINE

## 2025-07-03 PROCEDURE — 85379 FIBRIN DEGRADATION QUANT: CPT

## 2025-07-03 PROCEDURE — 1159F MED LIST DOCD IN RCRD: CPT | Performed by: INTERNAL MEDICINE

## 2025-07-03 PROCEDURE — 99214 OFFICE O/P EST MOD 30 MIN: CPT | Performed by: INTERNAL MEDICINE

## 2025-07-03 PROCEDURE — 71046 X-RAY EXAM CHEST 2 VIEWS: CPT | Performed by: RADIOLOGY

## 2025-07-03 RX ORDER — AZITHROMYCIN 250 MG/1
TABLET, FILM COATED ORAL
Qty: 6 TABLET | Refills: 0 | Status: SHIPPED | OUTPATIENT
Start: 2025-07-03 | End: 2025-07-08

## 2025-07-03 ASSESSMENT — PATIENT HEALTH QUESTIONNAIRE - PHQ9
2. FEELING DOWN, DEPRESSED OR HOPELESS: NOT AT ALL
SUM OF ALL RESPONSES TO PHQ9 QUESTIONS 1 AND 2: 0
1. LITTLE INTEREST OR PLEASURE IN DOING THINGS: NOT AT ALL

## 2025-07-03 NOTE — PROGRESS NOTES
Subjective   Patient ID: [id number]     Nhung Pimentel is a 79 y.o. female who presents for Shortness of Breath (Patient states this has been going on for a week), Cough, and Weight Loss (Patient is at appt for for SOB, Cough, Weight Loss).  Current Medications[1]  History of Present Illness  The patient presents with shortness of breath, chronic cough, and weight loss.    Patient is actively followed in the pulmonary department in [facility] last seen in 2024.  Patient is known to have predominant bronchiectasis etiology unclear with radiographic distribution suspicious for known TB mycobacterial disease as a possible etiology.  Workup was initiated with blood work and sputum, from reviewing the chart the blood work seems to be negative.  Also there was the presence of subcentimeter lung nodule likely results from bronchiectasis repeat CT scan was determined for 1 year from the initial visit.    She reports worsening shortness of breath over the past week, with difficulty taking deep breaths. Slight improvement today, but still feels unwell. Fatigue and rapid weight loss from 120 to 114 pounds within a month. Decreased appetite.    Persistent dry cough, worsened over the past week, especially in the mornings. Takes Robitussin before bedtime.  Excessive night sweats, feels cold during the day. Mild congestion and a cold sore, no sore throat.    Appointment with Dr. Mera in 2025 and repeat CT scan in 2025. Has not informed lung specialist about recent symptoms.  No history of blood clots.    FAMILY HISTORY  Brother  of heart attack.  Mother  of hernia.       Review of system was reviewed all normal except what is noted in HPI   Medical History[2]   Objective   /65 (BP Location: Left arm, Patient Position: Sitting, BP Cuff Size: Adult)   Pulse 99   Temp 36.1 °C (97 °F) (Temporal)   Wt 53.7 kg (118 lb 6.4 oz)   SpO2 95%   BMI 22.37 kg/m²      Physical  Exam  Constitutional:       Appearance: Normal appearance.   HENT:      Head: Normocephalic.   Cardiovascular:      Rate and Rhythm: Normal rate and regular rhythm.   Pulmonary:      Effort: Pulmonary effort is normal.      Breath sounds: Normal breath sounds.   Musculoskeletal:      Cervical back: Normal range of motion.   Neurological:      Mental Status: She is alert.   Psychiatric:         Mood and Affect: Mood normal.         Behavior: Behavior normal.         Thought Content: Thought content normal.         Judgment: Judgment normal.           Assessment/Plan   Assessment & Plan  1. Shortness of breath in a patient with history of bronchiectasis  - Worsening over the past week, difficulty taking deep breaths  - Slight improvement today but still unwell  - Fatigue and rapid weight loss from 120 to 114 pounds within a month  - Order chest x-ray to rule out pneumonia  - Basic blood work, including D-dimer test, today to rule out any thromboembolic event  - If D-dimer positive, CT angiogram of chest to exclude pulmonary embolism  - Take Zithromax, 1 gram of vitamin C daily, maintain hydration  - Inform pulmonologist via MyChart  - Communicate blood work results  - If D-dimer positive, visit ER for further evaluation    2. Chronic cough:  - Worsening dry cough over the past week, exacerbated by air conditioning and cold water  - Non-productive  - Using Robitussin at night for sleep  - Continue Robitussin as needed for symptom relief  - If chest x-ray indicates pneumonia, prescribe appropriate antibiotics    3. Weight loss:  - Unexplained weight loss of 5 to 8 pounds over the past month  - Decreased appetite  - Basic blood work today to investigate causes  - Monitor weight and dietary intake closely, it is important to notify the pulmonary specialist may be repeated CT scan sooner would be indicated for further evaluation of the weight loss and to follow-up on the pulmonary nodule.         Vannessa Mcneal MD    This medical note was created with the assistance of artificial intelligence (AI) for documentation purposes. The content has been reviewed and confirmed by the healthcare provider for accuracy and completeness. Patient consented to the use of audio recording and use of AI during their visit.           [1]   Current Outpatient Medications:     acyclovir (Zovirax) 400 mg tablet, Take 1 tablet (400 mg) by mouth 2 times a day., Disp: 60 tablet, Rfl: 2    calcium carbonate (Calcium 500) 500 mg calcium (1,250 mg) chewable tablet, Chew and swallow 1 tablet (500 mg of elemental calcium) once daily., Disp: , Rfl:     cholecalciferol (Vitamin D3) 25 MCG (1000 UT) capsule, Take 1 capsule (25 mcg) by mouth once daily., Disp: , Rfl:     ginkgo biloba (GINKOBA ORAL), Take 1 capsule by mouth once daily., Disp: , Rfl:     magnesium 200 mg tablet, Take by mouth., Disp: , Rfl:     multivitamin tablet, Take 1 tablet by mouth once daily., Disp: , Rfl:     omega 3-dha-epa-fish oil (Fish OiL) 1,000 mg (120 mg-180 mg) capsule, Take 1 capsule (1,000 mg) by mouth once daily., Disp: , Rfl:     Panax, American ginsg/B12/royl (GINSENG COMPLEX ORAL), Take 1 capsule by mouth once daily., Disp: , Rfl:     ZINC OXIDE ORAL, Take by mouth., Disp: , Rfl:     albuterol (ProAir HFA) 90 mcg/actuation inhaler, Inhale 2 puffs every 4 hours if needed for wheezing or shortness of breath., Disp: 8.5 g, Rfl: 0  [2]   Past Medical History:  Diagnosis Date    Atypical squamous cells of undetermined significance on cytologic smear of cervix (ASC-US) 06/29/2013    Pap smear abnormality of cervix with ASCUS favoring benign    Postmenopausal bleeding     Postmenopausal bleeding

## 2025-07-04 DIAGNOSIS — D64.9 ANEMIA, UNSPECIFIED TYPE: Primary | ICD-10-CM

## 2025-07-04 LAB
ERYTHROCYTE [DISTWIDTH] IN BLOOD BY AUTOMATED COUNT: 12.1 % (ref 11–15)
HCT VFR BLD AUTO: 34.9 % (ref 35–45)
HGB BLD-MCNC: 11.1 G/DL (ref 11.7–15.5)
MCH RBC QN AUTO: 29.5 PG (ref 27–33)
MCHC RBC AUTO-ENTMCNC: 31.8 G/DL (ref 32–36)
MCV RBC AUTO: 92.8 FL (ref 80–100)
PLATELET # BLD AUTO: 436 THOUSAND/UL (ref 140–400)
PMV BLD REES-ECKER: 10.2 FL (ref 7.5–12.5)
RBC # BLD AUTO: 3.76 MILLION/UL (ref 3.8–5.1)
WBC # BLD AUTO: 8.6 THOUSAND/UL (ref 3.8–10.8)

## 2025-07-07 ASSESSMENT — ENCOUNTER SYMPTOMS
ABDOMINAL PAIN: 0
FEVER: 0
VOMITING: 0
RHINORRHEA: 0
SWOLLEN GLANDS: 0
HEADACHES: 0
NECK PAIN: 0
SORE THROAT: 0
SPUTUM PRODUCTION: 0
SHORTNESS OF BREATH: 1
HEMOPTYSIS: 0
ORTHOPNEA: 0
WHEEZING: 0
SYNCOPE: 0
CLAUDICATION: 0
LEG PAIN: 0
PND: 0

## 2025-07-08 ENCOUNTER — OFFICE VISIT (OUTPATIENT)
Dept: PRIMARY CARE | Facility: CLINIC | Age: 80
End: 2025-07-08
Payer: MEDICARE

## 2025-07-08 VITALS
WEIGHT: 117.4 LBS | DIASTOLIC BLOOD PRESSURE: 71 MMHG | BODY MASS INDEX: 22.18 KG/M2 | OXYGEN SATURATION: 91 % | HEART RATE: 101 BPM | SYSTOLIC BLOOD PRESSURE: 113 MMHG | TEMPERATURE: 97.5 F

## 2025-07-08 DIAGNOSIS — J18.9 MULTIFOCAL PNEUMONIA: ICD-10-CM

## 2025-07-08 DIAGNOSIS — D50.9 IRON DEFICIENCY ANEMIA, UNSPECIFIED IRON DEFICIENCY ANEMIA TYPE: ICD-10-CM

## 2025-07-08 DIAGNOSIS — R63.0 LACK OF APPETITE: ICD-10-CM

## 2025-07-08 DIAGNOSIS — Z12.11 ENCOUNTER FOR SCREENING COLONOSCOPY: Primary | ICD-10-CM

## 2025-07-08 DIAGNOSIS — D12.6 TUBULAR ADENOMA OF COLON: ICD-10-CM

## 2025-07-08 DIAGNOSIS — B00.1 COLD SORE: ICD-10-CM

## 2025-07-08 LAB
FERRITIN SERPL-MCNC: 147 NG/ML (ref 16–288)
FOLATE SERPL-MCNC: 8.8 NG/ML
IRON SATN MFR SERPL: 8 % (CALC) (ref 16–45)
IRON SERPL-MCNC: 17 MCG/DL (ref 45–160)
TIBC SERPL-MCNC: 211 MCG/DL (CALC) (ref 250–450)
VIT B12 SERPL-MCNC: >2000 PG/ML (ref 200–1100)

## 2025-07-08 PROCEDURE — 99214 OFFICE O/P EST MOD 30 MIN: CPT | Performed by: INTERNAL MEDICINE

## 2025-07-08 PROCEDURE — G2211 COMPLEX E/M VISIT ADD ON: HCPCS | Performed by: INTERNAL MEDICINE

## 2025-07-08 PROCEDURE — 1160F RVW MEDS BY RX/DR IN RCRD: CPT | Performed by: INTERNAL MEDICINE

## 2025-07-08 PROCEDURE — 1159F MED LIST DOCD IN RCRD: CPT | Performed by: INTERNAL MEDICINE

## 2025-07-08 RX ORDER — VALACYCLOVIR HYDROCHLORIDE 1 G/1
2000 TABLET, FILM COATED ORAL 2 TIMES DAILY
Qty: 6 TABLET | Refills: 0 | Status: SHIPPED | OUTPATIENT
Start: 2025-07-08 | End: 2025-07-09

## 2025-07-08 RX ORDER — MIRTAZAPINE 7.5 MG/1
7.5 TABLET, FILM COATED ORAL NIGHTLY
Qty: 30 TABLET | Refills: 1 | Status: SHIPPED | OUTPATIENT
Start: 2025-07-08 | End: 2026-01-04

## 2025-07-08 NOTE — PROGRESS NOTES
Subjective   Patient ID: Nhung Pimentel is a 79 y.o. female who presents for Follow-up (Patient is here for a follow up on pneumonia. ).    This is a 79-year-old patient who has been treated with Zithromax by , when seen by her on 7/3/2025 for 1 month history of decreased appetite, worsening shortness of breath and cough, she was sent to ER because her D-dimer was elevated, her CT angio was negative for PE but did show multifocal pneumonia, patient did not want to get admitted because of the holiday, she made an appointment to see her pulmonologist at [facility] tomorrow, has been seen by him for her history of bronchiectasis.  She continued to feel short of breath, fatigue and lack of appetite, also noted on her recent labs iron deficiency anemia, she is at high risk due to history of colon polyp she was supposed to go for colonoscopy November 2024 but patient never made the appointment despite doing a referral for her, but she will after she recovers from current pneumonia.    Shortness of Breath  This is a recurrent problem. The current episode started 1 to 4 weeks ago. The problem occurs daily. The problem has been waxing and waning. The average episode lasts 15 minutes. Pertinent negatives include no abdominal pain, chest pain, claudication, coryza, ear pain, fever, headaches, hemoptysis, leg pain, leg swelling, neck pain, orthopnea, PND, rash, rhinorrhea, sore throat, sputum production, swollen glands, syncope, vomiting or wheezing. The symptoms are aggravated by exercise.        Review of Systems   Constitutional:  Positive for fatigue and unexpected weight change. Negative for fever.   HENT:  Negative for ear pain, rhinorrhea and sore throat.    Respiratory:  Positive for cough and shortness of breath. Negative for hemoptysis, sputum production and wheezing.    Cardiovascular:  Negative for chest pain, orthopnea, claudication, leg swelling, syncope and PND.   Gastrointestinal:  Negative for abdominal  pain and vomiting.   Musculoskeletal:  Negative for neck pain.   Skin:  Negative for rash.   Neurological:  Negative for headaches.       Objective   /71 (BP Location: Right arm, Patient Position: Sitting, BP Cuff Size: Adult)   Pulse 101   Temp 36.4 °C (97.5 °F) (Temporal)   Wt 53.3 kg (117 lb 6.4 oz)   SpO2 91%   BMI 22.18 kg/m²     Physical Exam  Constitutional:       General: She is not in acute distress.     Appearance: Normal appearance. She is normal weight. She is not toxic-appearing or diaphoretic.      Comments: Speaking in full sentences, but looks tired and concerned about her weight loss and lack of appetite   HENT:      Head: Normocephalic and atraumatic.      Mouth/Throat:      Mouth: Mucous membranes are moist.      Pharynx: No oropharyngeal exudate or posterior oropharyngeal erythema.   Eyes:      Extraocular Movements: Extraocular movements intact.      Pupils: Pupils are equal, round, and reactive to light.   Cardiovascular:      Rate and Rhythm: Normal rate and regular rhythm.      Heart sounds: Normal heart sounds.   Pulmonary:      Effort: Pulmonary effort is normal.      Breath sounds: Normal breath sounds. No wheezing or rhonchi.   Abdominal:      General: Abdomen is flat. Bowel sounds are normal. There is no distension.      Palpations: Abdomen is soft.   Musculoskeletal:         General: Normal range of motion.      Cervical back: Normal range of motion and neck supple.      Right lower leg: No edema.      Left lower leg: No edema.   Lymphadenopathy:      Cervical: No cervical adenopathy.   Skin:     General: Skin is warm.   Neurological:      General: No focal deficit present.      Mental Status: She is alert and oriented to person, place, and time.      Cranial Nerves: No cranial nerve deficit.   Psychiatric:         Mood and Affect: Mood normal.         Behavior: Behavior normal.         Assessment/Plan   Problem List Items Addressed This Visit           ICD-10-CM    Tubular  adenoma of colon D12.6    Overdue for colonoscopy there was order for colonoscopy from November 2024 not done yet but will plan for 1 after she recovered from recurrent pneumonia.         Multifocal pneumonia J18.9    On Zithromax, very minimal improvement, her pulse oximetry was 91 on room air at rest and went down to 87 with exercise, patient should have been admitted for IV antibiotic but she declined when she was seen in ER at [facility], she has an appointment tomorrow with her pulmonologist, I did not change her antibiotic for now for that reason, advised to rest.  She might need bronchoscopy to rule out any underlying malignancy especially with her weight loss.  I am also concerned about her iron deficiency anemia did put a new order for colonoscopy to be done after she recovers from her current pneumonia.  She can start iron 325 mg twice daily.  I added Remeron to help improve her appetite.  Keep her follow-up appointment tomorrow with her pulmonologist at [facility].  To ER if worse.          Other Visit Diagnoses         Codes      Encounter for screening colonoscopy    -  Primary Z12.11    Relevant Orders    Colonoscopy Screening; High Risk Patient; h/o colon polyp      Iron deficiency anemia, unspecified iron deficiency anemia type     D50.9    Relevant Orders    CBC and Auto Differential    Iron and TIBC      Lack of appetite     R63.0    Relevant Medications    mirtazapine (Remeron) 7.5 mg tablet      Cold sore     B00.1

## 2025-07-10 ENCOUNTER — TELEPHONE (OUTPATIENT)
Dept: GASTROENTEROLOGY | Facility: CLINIC | Age: 80
End: 2025-07-10
Payer: MEDICARE

## 2025-07-10 PROBLEM — J18.9 MULTIFOCAL PNEUMONIA: Status: ACTIVE | Noted: 2025-07-10

## 2025-07-10 ASSESSMENT — ENCOUNTER SYMPTOMS
FATIGUE: 1
ABDOMINAL PAIN: 0
FEVER: 0
SPUTUM PRODUCTION: 0
VOMITING: 0
LEG PAIN: 0
SHORTNESS OF BREATH: 1
WHEEZING: 0
UNEXPECTED WEIGHT CHANGE: 1
CLAUDICATION: 0
SYNCOPE: 0
SORE THROAT: 0
NECK PAIN: 0
PND: 0
COUGH: 1
ORTHOPNEA: 0
HEMOPTYSIS: 0
HEADACHES: 0
SWOLLEN GLANDS: 0
RHINORRHEA: 0

## 2025-07-10 NOTE — ASSESSMENT & PLAN NOTE
Overdue for colonoscopy there was order for colonoscopy from November 2024 not done yet but will plan for 1 after she recovered from recurrent pneumonia.

## 2025-07-10 NOTE — TELEPHONE ENCOUNTER
Unable to  call patient to schedule a colon consult due to age.  Patient is admitted into the hospital at this time.

## 2025-07-10 NOTE — ASSESSMENT & PLAN NOTE
On Zithromax, very minimal improvement, her pulse oximetry was 91 on room air at rest and went down to 87 with exercise, patient should have been admitted for IV antibiotic but she declined when she was seen in ER at [facility], she has an appointment tomorrow with her pulmonologist, I did not change her antibiotic for now for that reason, advised to rest.  She might need bronchoscopy to rule out any underlying malignancy especially with her weight loss.  I am also concerned about her iron deficiency anemia did put a new order for colonoscopy to be done after she recovers from her current pneumonia.  She can start iron 325 mg twice daily.  I added Remeron to help improve her appetite.  Keep her follow-up appointment tomorrow with her pulmonologist at [facility].  To ER if worse.

## 2025-07-15 ENCOUNTER — PATIENT OUTREACH (OUTPATIENT)
Dept: PRIMARY CARE | Facility: CLINIC | Age: 80
End: 2025-07-15
Payer: MEDICARE

## 2025-07-15 NOTE — PROGRESS NOTES
Discharge Facility: [facility]  Discharge Diagnosis: pneumonia  Admission Date: 7/9/25  Discharge Date: 7/11/25    PCP Appointment Date: TBD  Specialist Appointment Date: 7/15 pulmonology  Hospital Encounter and Summary Linked: Yes  Hospital Encounter    Two attempts were made to reach patient within two business days after discharge. Left voicemail with contact information for patient to call back with any non-emergent questions or concerns.

## 2025-08-06 ENCOUNTER — APPOINTMENT (OUTPATIENT)
Dept: PRIMARY CARE | Facility: CLINIC | Age: 80
End: 2025-08-06
Payer: MEDICARE

## 2025-08-08 DIAGNOSIS — D50.9 IRON DEFICIENCY ANEMIA, UNSPECIFIED IRON DEFICIENCY ANEMIA TYPE: ICD-10-CM

## 2025-11-12 ENCOUNTER — APPOINTMENT (OUTPATIENT)
Dept: PRIMARY CARE | Facility: CLINIC | Age: 80
End: 2025-11-12
Payer: MEDICARE